# Patient Record
Sex: FEMALE | Race: OTHER | Employment: FULL TIME | URBAN - METROPOLITAN AREA
[De-identification: names, ages, dates, MRNs, and addresses within clinical notes are randomized per-mention and may not be internally consistent; named-entity substitution may affect disease eponyms.]

---

## 2023-05-26 ENCOUNTER — APPOINTMENT (OUTPATIENT)
Dept: PRIMARY CARE | Facility: CLINIC | Age: 43
End: 2023-05-26
Payer: COMMERCIAL

## 2023-06-09 ENCOUNTER — OFFICE VISIT (OUTPATIENT)
Dept: PRIMARY CARE | Facility: CLINIC | Age: 43
End: 2023-06-09
Payer: COMMERCIAL

## 2023-06-09 VITALS
WEIGHT: 178.4 LBS | HEART RATE: 104 BPM | HEIGHT: 62 IN | DIASTOLIC BLOOD PRESSURE: 95 MMHG | BODY MASS INDEX: 32.83 KG/M2 | OXYGEN SATURATION: 99 % | SYSTOLIC BLOOD PRESSURE: 135 MMHG

## 2023-06-09 DIAGNOSIS — Z01.419 WELL WOMAN EXAM: ICD-10-CM

## 2023-06-09 DIAGNOSIS — Z76.89 ENCOUNTER TO ESTABLISH CARE: Primary | ICD-10-CM

## 2023-06-09 DIAGNOSIS — M79.672 CHRONIC HEEL PAIN, LEFT: ICD-10-CM

## 2023-06-09 DIAGNOSIS — G89.29 CHRONIC HEEL PAIN, LEFT: ICD-10-CM

## 2023-06-09 DIAGNOSIS — E55.9 VITAMIN D DEFICIENCY: ICD-10-CM

## 2023-06-09 DIAGNOSIS — R03.0 ELEVATED BLOOD PRESSURE READING: ICD-10-CM

## 2023-06-09 DIAGNOSIS — Z12.31 VISIT FOR SCREENING MAMMOGRAM: ICD-10-CM

## 2023-06-09 DIAGNOSIS — I83.90 VARICOSE VEINS OF LOWER EXTREMITY, UNSPECIFIED LATERALITY, UNSPECIFIED WHETHER COMPLICATED: ICD-10-CM

## 2023-06-09 PROCEDURE — 1036F TOBACCO NON-USER: CPT | Performed by: STUDENT IN AN ORGANIZED HEALTH CARE EDUCATION/TRAINING PROGRAM

## 2023-06-09 PROCEDURE — 99204 OFFICE O/P NEW MOD 45 MIN: CPT | Performed by: STUDENT IN AN ORGANIZED HEALTH CARE EDUCATION/TRAINING PROGRAM

## 2023-06-09 NOTE — PROGRESS NOTES
"Subjective   Patient ID: Gricelda Red is a 42 y.o. female who presents for New Patient Visit (Discuss varicose veins and bilateral foot pain. ).        HPI    Est care   .Pt's PMH, PSH, SH, FH , meds and allergies was obtained / reviewed and updated .     Blood pressure is elevated in the office but denied any CP/ SOB/ HA/ Blurry vision / Slurry speech/ tingling/ numbness/ weakness of extremities.   Rpt BP as noted in vitals   Normal reading in 2021    Would like labs done cause she is gaining weight     Varicose veins     Left heel pain , chronic X 6months       Visit Vitals  BP (!) 135/95   Pulse 104   Ht 1.575 m (5' 2\")   Wt 80.9 kg (178 lb 6.4 oz)   LMP 06/09/2023   SpO2 99%   BMI 32.63 kg/m²   Smoking Status Never   BSA 1.88 m²      Patient's last menstrual period was 06/09/2023.     Review of Systems    Constitutional : No feeling poorly / fevers/ chills / night sweats/ fatigue   Cardiovascular : No CP /Palpitations/ lower extremity edema / syncope   As noted in HPI   Respiratory : No Cough /PRUITT/Dyspnea at rest   Gastrointestinal : No abd pain / N/V  No bloody stools/ melena / constipation  MSK :As noted in HPI   Endo : No polyuria/polydipsia/ muscle weakness / sluggishness   CNS: No confusion / HA/ tingling/ numbness/ weakness of extremities  Psychiatric: No anxiety/ depression/ SI/HI    All other systems have been reviewed and are negative for complaint       Physical Exam    Constitutional : Vitals reviewed. Alert and in no distress  Cardiovascular : RRR, Normal S1, S2, No pericardial rub/ gallop, no peripheral edema   Varicose veins noted in the LEs  Pulmonary: No respiratory distress, CTAB   MSK : Normal gait and station , strength and tone   Skin: Normal skin color and pigmentation, normal skin turgor and no rash   Neurologic : CNs 2-12 grossly intact , no obvious FNDs  Psych : A,Ox3, normal mood and affect      Assessment/Plan   Diagnoses and all orders for this visit:  Encounter to establish care  -     " "CBC; Future  -     Comprehensive Metabolic Panel; Future  -     Hemoglobin A1C; Future  -     Lipid Panel; Future  -     TSH with reflex to Free T4 if abnormal; Future  Varicose veins of lower extremity, unspecified laterality, unspecified whether complicated  -     Referral to Vascular Surgery; Future  -     Compression Stockings 15-20 mmHg  Chronic heel pain, left  -     Referral to Orthopaedic Surgery; Future  Vitamin D deficiency  -     Vitamin D, Total; Future  Visit for screening mammogram  -     BI mammo bilateral screening tomosynthesis; Future  Elevated blood pressure reading  Well woman exam  -     Referral to Gynecology; Future       42-year-old female presents to establish care with multiple concerns.      #Elevated blood pressure reading.  Repeat blood pressure is noted in the vitals.  Patient has no prior history of high blood pressure and is not on treatment.  To return to the office in 1 month.      She expressed hesitancy returning\" just for blood pressure check\"  .    I suggested that she can establish care with another physician closer to her residence.   Uncontrolled / untreated blood pressure can cause heart attacks , heart failure , strokes , kidney damage that can eventually cause complete shut down of kidneys requiring you to be on dialysis.     If you experience any chest pain , shortness of breath, headaches, blurry vision , slurry speech , weakness of your hands or legs, tingling or numbness - symptoms of heart attack and a stroke , please call 911 and go to the emergency room     #Referred to orthopedics for chronic left heel pain.    #Varicose veins: Compression stockings advised.  I doubt any intervention will be done at this stage but patient wanted an intervention, referral placed.    #Intermittent constipation: Fluid and fiber and movement advised.    #Referred her to GYN to establish care      Age appropriate labs / labs for mgmt of chronic medical conditions ordered, further mgmt " pending the results.      RTO in 1m

## 2023-06-19 ENCOUNTER — LAB (OUTPATIENT)
Dept: LAB | Facility: LAB | Age: 43
End: 2023-06-19
Payer: COMMERCIAL

## 2023-06-19 DIAGNOSIS — E55.9 VITAMIN D DEFICIENCY: ICD-10-CM

## 2023-06-19 DIAGNOSIS — Z76.89 ENCOUNTER TO ESTABLISH CARE: ICD-10-CM

## 2023-06-19 LAB
ALANINE AMINOTRANSFERASE (SGPT) (U/L) IN SER/PLAS: 28 U/L (ref 7–45)
ALBUMIN (G/DL) IN SER/PLAS: 4.1 G/DL (ref 3.4–5)
ALKALINE PHOSPHATASE (U/L) IN SER/PLAS: 67 U/L (ref 33–110)
ANION GAP IN SER/PLAS: 9 MMOL/L (ref 10–20)
ASPARTATE AMINOTRANSFERASE (SGOT) (U/L) IN SER/PLAS: 24 U/L (ref 9–39)
BILIRUBIN TOTAL (MG/DL) IN SER/PLAS: 0.8 MG/DL (ref 0–1.2)
CALCIDIOL (25 OH VITAMIN D3) (NG/ML) IN SER/PLAS: 22 NG/ML
CALCIUM (MG/DL) IN SER/PLAS: 9.3 MG/DL (ref 8.6–10.6)
CARBON DIOXIDE, TOTAL (MMOL/L) IN SER/PLAS: 28 MMOL/L (ref 21–32)
CHLORIDE (MMOL/L) IN SER/PLAS: 105 MMOL/L (ref 98–107)
CHOLESTEROL (MG/DL) IN SER/PLAS: 157 MG/DL (ref 0–199)
CHOLESTEROL IN HDL (MG/DL) IN SER/PLAS: 72.6 MG/DL
CHOLESTEROL/HDL RATIO: 2.2
CREATININE (MG/DL) IN SER/PLAS: 0.65 MG/DL (ref 0.5–1.05)
ERYTHROCYTE DISTRIBUTION WIDTH (RATIO) BY AUTOMATED COUNT: 12.2 % (ref 11.5–14.5)
ERYTHROCYTE MEAN CORPUSCULAR HEMOGLOBIN CONCENTRATION (G/DL) BY AUTOMATED: 33.5 G/DL (ref 32–36)
ERYTHROCYTE MEAN CORPUSCULAR VOLUME (FL) BY AUTOMATED COUNT: 87 FL (ref 80–100)
ERYTHROCYTES (10*6/UL) IN BLOOD BY AUTOMATED COUNT: 4.62 X10E12/L (ref 4–5.2)
ESTIMATED AVERAGE GLUCOSE FOR HBA1C: 103 MG/DL
GFR FEMALE: >90 ML/MIN/1.73M2
GLUCOSE (MG/DL) IN SER/PLAS: 97 MG/DL (ref 74–99)
HEMATOCRIT (%) IN BLOOD BY AUTOMATED COUNT: 40 % (ref 36–46)
HEMOGLOBIN (G/DL) IN BLOOD: 13.4 G/DL (ref 12–16)
HEMOGLOBIN A1C/HEMOGLOBIN TOTAL IN BLOOD: 5.2 %
LDL: 67 MG/DL (ref 0–99)
LEUKOCYTES (10*3/UL) IN BLOOD BY AUTOMATED COUNT: 5.2 X10E9/L (ref 4.4–11.3)
NRBC (PER 100 WBCS) BY AUTOMATED COUNT: 0 /100 WBC (ref 0–0)
PLATELETS (10*3/UL) IN BLOOD AUTOMATED COUNT: 321 X10E9/L (ref 150–450)
POTASSIUM (MMOL/L) IN SER/PLAS: 4 MMOL/L (ref 3.5–5.3)
PROTEIN TOTAL: 7.1 G/DL (ref 6.4–8.2)
SODIUM (MMOL/L) IN SER/PLAS: 138 MMOL/L (ref 136–145)
THYROTROPIN (MIU/L) IN SER/PLAS BY DETECTION LIMIT <= 0.05 MIU/L: 1.01 MIU/L (ref 0.44–3.98)
TRIGLYCERIDE (MG/DL) IN SER/PLAS: 87 MG/DL (ref 0–149)
UREA NITROGEN (MG/DL) IN SER/PLAS: 7 MG/DL (ref 6–23)
VLDL: 17 MG/DL (ref 0–40)

## 2023-06-19 PROCEDURE — 83036 HEMOGLOBIN GLYCOSYLATED A1C: CPT

## 2023-06-19 PROCEDURE — 36415 COLL VENOUS BLD VENIPUNCTURE: CPT

## 2023-06-19 PROCEDURE — 80053 COMPREHEN METABOLIC PANEL: CPT

## 2023-06-19 PROCEDURE — 80061 LIPID PANEL: CPT

## 2023-06-19 PROCEDURE — 84443 ASSAY THYROID STIM HORMONE: CPT

## 2023-06-19 PROCEDURE — 85027 COMPLETE CBC AUTOMATED: CPT

## 2023-06-19 PROCEDURE — 82306 VITAMIN D 25 HYDROXY: CPT

## 2023-07-10 ENCOUNTER — APPOINTMENT (OUTPATIENT)
Dept: PRIMARY CARE | Facility: CLINIC | Age: 43
End: 2023-07-10
Payer: COMMERCIAL

## 2023-10-07 PROBLEM — L72.8 OTHER FOLLICULAR CYSTS OF THE SKIN AND SUBCUTANEOUS TISSUE: Status: ACTIVE | Noted: 2021-11-12

## 2023-10-07 PROBLEM — R20.0 NUMBNESS AND TINGLING: Status: ACTIVE | Noted: 2023-10-07

## 2023-10-07 PROBLEM — H52.12 MYOPIA OF LEFT EYE: Status: ACTIVE | Noted: 2023-10-07

## 2023-10-07 PROBLEM — L81.4 OTHER MELANIN HYPERPIGMENTATION: Status: ACTIVE | Noted: 2021-11-12

## 2023-10-07 PROBLEM — D31.01 NEVUS OF RIGHT CONJUNCTIVA: Status: ACTIVE | Noted: 2023-10-07

## 2023-10-07 PROBLEM — H52.203 ASTIGMATISM OF BOTH EYES: Status: ACTIVE | Noted: 2023-10-07

## 2023-10-07 PROBLEM — I83.93 VARICOSE VEINS OF LEGS: Status: ACTIVE | Noted: 2023-10-07

## 2023-10-07 PROBLEM — E55.9 VITAMIN D DEFICIENCY: Status: ACTIVE | Noted: 2023-10-07

## 2023-10-07 PROBLEM — M54.12 CERVICAL RADICULOPATHY, ACUTE: Status: ACTIVE | Noted: 2023-10-07

## 2023-10-07 PROBLEM — M79.89 LEG SWELLING: Status: ACTIVE | Noted: 2023-10-07

## 2023-10-07 PROBLEM — R20.2 NUMBNESS AND TINGLING: Status: ACTIVE | Noted: 2023-10-07

## 2023-10-07 PROBLEM — R63.5 WEIGHT GAIN: Status: ACTIVE | Noted: 2023-10-07

## 2023-10-07 PROBLEM — M72.2 BILATERAL PLANTAR FASCIITIS: Status: ACTIVE | Noted: 2023-10-07

## 2023-10-07 PROBLEM — R53.83 FATIGUE: Status: ACTIVE | Noted: 2023-10-07

## 2023-10-07 PROBLEM — M41.9 SCOLIOSIS: Status: ACTIVE | Noted: 2023-10-07

## 2023-10-07 RX ORDER — NABUMETONE 750 MG/1
1 TABLET, FILM COATED ORAL EVERY 12 HOURS
COMMUNITY
Start: 2021-11-09 | End: 2024-01-04 | Stop reason: ALTCHOICE

## 2023-10-07 RX ORDER — FLUOXETINE 10 MG/1
CAPSULE ORAL
COMMUNITY
Start: 2014-07-24 | End: 2024-01-04 | Stop reason: ALTCHOICE

## 2023-10-07 RX ORDER — TRETINOIN 0.5 MG/G
1 CREAM TOPICAL
COMMUNITY
Start: 2021-11-12 | End: 2024-01-04 | Stop reason: ALTCHOICE

## 2023-10-07 RX ORDER — CYCLOBENZAPRINE HCL 10 MG
1 TABLET ORAL 3 TIMES DAILY PRN
COMMUNITY
Start: 2021-11-09 | End: 2024-01-04 | Stop reason: ALTCHOICE

## 2023-10-07 RX ORDER — CHOLECALCIFEROL (VITAMIN D3) 1250 MCG
1250 TABLET ORAL
COMMUNITY
End: 2024-01-04 | Stop reason: ALTCHOICE

## 2023-10-07 RX ORDER — GABAPENTIN 300 MG/1
CAPSULE ORAL
COMMUNITY
Start: 2012-03-13 | End: 2024-01-04 | Stop reason: ALTCHOICE

## 2023-10-07 RX ORDER — PROMETHAZINE HYDROCHLORIDE 6.25 MG/5ML
10 SYRUP ORAL 2 TIMES DAILY PRN
COMMUNITY
Start: 2022-04-21 | End: 2024-01-04 | Stop reason: ALTCHOICE

## 2023-10-07 RX ORDER — OXAPROZIN 600 MG/1
600 TABLET, FILM COATED ORAL 2 TIMES DAILY
COMMUNITY
Start: 2012-03-13 | End: 2024-01-04 | Stop reason: ALTCHOICE

## 2023-10-07 RX ORDER — CEFDINIR 300 MG/1
1 CAPSULE ORAL 2 TIMES DAILY
COMMUNITY
Start: 2022-04-21 | End: 2024-01-04 | Stop reason: ALTCHOICE

## 2023-10-07 RX ORDER — LEVOCETIRIZINE DIHYDROCHLORIDE 5 MG/1
1 TABLET, FILM COATED ORAL DAILY
COMMUNITY
Start: 2022-04-21 | End: 2024-01-04 | Stop reason: ALTCHOICE

## 2023-10-07 ASSESSMENT — CUP TO DISC RATIO
OD_RATIO: 0.3
OS_RATIO: 0.3

## 2023-10-07 ASSESSMENT — SLIT LAMP EXAM - LIDS
COMMENTS: GOOD POSITION
COMMENTS: GOOD POSITION

## 2023-10-07 ASSESSMENT — EXTERNAL EXAM - LEFT EYE: OS_EXAM: NORMAL

## 2023-10-07 ASSESSMENT — EXTERNAL EXAM - RIGHT EYE: OD_EXAM: NORMAL

## 2023-10-07 NOTE — PROGRESS NOTES
Nevus of conjunctiva, right eye  -Noted on exam in 2022 ([facility]ass)  -Patient hasn't noted change over time.   -No concerning features on exam at this time. Recommend observation.     Astigmatism  -New Rx given per patient request  -Obtained glasses in 2022 - bifocals/progressives initially, but did not like this, had trouble with reading, switched back to DVO  -Prefers DVO

## 2023-10-09 ENCOUNTER — OFFICE VISIT (OUTPATIENT)
Dept: OPHTHALMOLOGY | Facility: CLINIC | Age: 43
End: 2023-10-09
Payer: COMMERCIAL

## 2023-10-09 DIAGNOSIS — D31.01 NEVUS OF RIGHT CONJUNCTIVA: Primary | ICD-10-CM

## 2023-10-09 DIAGNOSIS — H52.203 ASTIGMATISM OF BOTH EYES, UNSPECIFIED TYPE: ICD-10-CM

## 2023-10-09 PROCEDURE — 92015 DETERMINE REFRACTIVE STATE: CPT | Performed by: OPHTHALMOLOGY

## 2023-10-09 PROCEDURE — 92014 COMPRE OPH EXAM EST PT 1/>: CPT | Performed by: OPHTHALMOLOGY

## 2023-10-09 ASSESSMENT — ENCOUNTER SYMPTOMS
NEUROLOGICAL NEGATIVE: 0
HEMATOLOGIC/LYMPHATIC NEGATIVE: 0
CARDIOVASCULAR NEGATIVE: 0
ALLERGIC/IMMUNOLOGIC NEGATIVE: 0
GASTROINTESTINAL NEGATIVE: 0
RESPIRATORY NEGATIVE: 0
CONSTITUTIONAL NEGATIVE: 0
ENDOCRINE NEGATIVE: 0
EYES NEGATIVE: 0
MUSCULOSKELETAL NEGATIVE: 0
PSYCHIATRIC NEGATIVE: 0

## 2023-10-09 ASSESSMENT — VISUAL ACUITY
OD_SC: 20/25
OS_SC: 20/30
METHOD: SNELLEN - LINEAR

## 2023-10-09 ASSESSMENT — REFRACTION_MANIFEST
OD_SPHERE: -0.25
OS_SPHERE: -0.50
OD_CYLINDER: -0.50
OD_AXIS: 080
OS_CYLINDER: -0.50
OS_AXIS: 090
METHOD_AUTOREFRACTION: 1
OS_SPHERE: -0.25
OS_CYLINDER: -0.50
OS_AXIS: 090
OD_AXIS: 090
OD_CYLINDER: -0.75
OD_SPHERE: -0.25

## 2023-10-09 ASSESSMENT — TONOMETRY
IOP_METHOD: GOLDMANN APPLANATION
OS_IOP_MMHG: 16
OD_IOP_MMHG: 15

## 2023-10-31 ENCOUNTER — HOSPITAL ENCOUNTER (OUTPATIENT)
Dept: RADIOLOGY | Facility: EXTERNAL LOCATION | Age: 43
Discharge: HOME | End: 2023-10-31
Payer: COMMERCIAL

## 2023-10-31 DIAGNOSIS — S99.921A RIGHT FOOT INJURY, INITIAL ENCOUNTER: ICD-10-CM

## 2023-10-31 DIAGNOSIS — S99.911A RIGHT ANKLE INJURY, INITIAL ENCOUNTER: ICD-10-CM

## 2023-11-01 ENCOUNTER — CLINICAL SUPPORT (OUTPATIENT)
Dept: SPORTS MEDICINE | Facility: HOSPITAL | Age: 43
End: 2023-11-01
Payer: COMMERCIAL

## 2023-11-01 VITALS — HEIGHT: 63 IN | WEIGHT: 180 LBS | BODY MASS INDEX: 31.89 KG/M2

## 2023-11-01 DIAGNOSIS — S92.354A CLOSED NONDISPLACED FRACTURE OF FIFTH METATARSAL BONE OF RIGHT FOOT, INITIAL ENCOUNTER: ICD-10-CM

## 2023-11-01 DIAGNOSIS — S92.351A CLOSED FRACTURE OF BASE OF FIFTH METATARSAL BONE OF RIGHT FOOT, INITIAL ENCOUNTER: Primary | ICD-10-CM

## 2023-11-01 PROCEDURE — 99214 OFFICE O/P EST MOD 30 MIN: CPT | Performed by: PHYSICIAN ASSISTANT

## 2023-11-01 PROCEDURE — 99204 OFFICE O/P NEW MOD 45 MIN: CPT | Performed by: PHYSICIAN ASSISTANT

## 2023-11-01 RX ORDER — TRAMADOL HYDROCHLORIDE 50 MG/1
50 TABLET ORAL EVERY 8 HOURS PRN
Qty: 15 TABLET | Refills: 0 | Status: SHIPPED | OUTPATIENT
Start: 2023-11-01 | End: 2023-11-06

## 2023-11-01 ASSESSMENT — PAIN DESCRIPTION - DESCRIPTORS: DESCRIPTORS: SHARP

## 2023-11-01 ASSESSMENT — PAIN - FUNCTIONAL ASSESSMENT: PAIN_FUNCTIONAL_ASSESSMENT: 0-10

## 2023-11-01 ASSESSMENT — PAIN SCALES - GENERAL: PAINLEVEL_OUTOF10: 10 - WORST POSSIBLE PAIN

## 2023-11-01 NOTE — PROGRESS NOTES
Subjective    Patient ID: Gricelda Red is a 43 y.o. female.    Chief Complaint: Pain of the Right Foot (Nondisplaced fracture at the base of the 5th metatarsal)           HPI:  Gricelda Red is a 43 y.o. female who presents to the orthopedic walk-in clinic for complaint of right foot pain.  She injured her right foot yesterday when she tripped going down the stairs.  She states that she inverted her foot and ankle.  She had pain over the lateral aspect of her foot and ankle.  She went to an urgent care.  X-rays had shown a proximal fifth metatarsal fracture.  She is provided with a fracture shoe and referred to orthopedics.  Today she reports 10 out of 10 pain.  She has difficulty bearing full weight.  Ibuprofen has not been helpful to control her symptoms.    ROS  Constitutional: No fever, no chills, not feeling tired, no recent weight gain and no recent weight loss  ENT: No nosebleeds  Cardiovascular: No chest pain  Respiratory: No shortness of breath and no cough  Gastrointestinal: No abdominal pain, no nausea, no diarrhea, and no vomiting  Musculoskeletal: No arthralgias  Integumentary: No rashes and no skin lesions  Neurological: No headache  Psychiatric: No sleep disturbances no depression  Endocrine: No muscle weakness and no muscle cramps  Hematologic/lymphatic: No swelling glands and no tendency for easy bruising    Past Medical History:   Diagnosis Date    Personal history of other diseases of the musculoskeletal system and connective tissue 04/24/2021    History of neck pain        History reviewed. No pertinent surgical history.       Current Outpatient Medications:     BARIUM SULFATE ORAL, Take 900 mL by mouth if needed. barium sulfate 1.2 % ORAL Susp, Disp: , Rfl:     benzoyl peroxide 5 % lotion, 1 Application., Disp: , Rfl:     cefdinir (Omnicef) 300 mg capsule, Take 1 capsule (300 mg) by mouth 2 times a day., Disp: , Rfl:     cholecalciferol (Vitamin D3) 1,250 mcg (50,000 unit) tablet, Take 1,250 mcg by  mouth every 7 days., Disp: , Rfl:     cyclobenzaprine (Flexeril) 10 mg tablet, Take 1 tablet (10 mg) by mouth 3 times a day as needed., Disp: , Rfl:     diclofenac sodium 1 % kit, APPLY TO UPPER EXTREMITIES, 2 GM OF GEL TO AFFECTED AREA 4 TIMES DAILY. DO NOT APPLY MORE THAN 8 GM DAILY TO ANY ONE AFFECTED JOINT., Disp: , Rfl:     FLUoxetine (PROzac) 10 mg capsule, TAKE 1 CAPSULE BY MOUTH EVERY OTHER DAY FOR 4 DAYS THEN TAKE 1 CAPSULE DAILY FOR 4 DAYS THEN TAKE 2 CAPSULES DAILY THEREAFTER, Disp: , Rfl:     gabapentin (Neurontin) 300 mg capsule, Take by mouth. 1 cap qhs x 4 days then bid x 4 days thereafter 2 caps qhs, 1 cap q am, Disp: , Rfl:     HYDROCODONE-ACETAMINOPHEN ORAL, Take 1 tablet by mouth every 6 hours if needed. acetaminophen-hydrocodone (VICODIN) 5-500 mg ORAL tablet, Disp: , Rfl:     levocetirizine (Xyzal) 5 mg tablet, Take 1 tablet (5 mg) by mouth once daily., Disp: , Rfl:     nabumetone (Relafen) 750 mg tablet, Take 1 tablet (750 mg) by mouth every 12 hours., Disp: , Rfl:     oxaprozin (Daypro) 600 mg tablet, Take 1 tablet (600 mg) by mouth 2 times a day., Disp: , Rfl:     promethazine (Phenergan) 6.25 mg/5 mL syrup, Take 10 mL (12.5 mg) by mouth 2 times a day as needed (After Meals for cough)., Disp: , Rfl:     traMADol (Ultram) 50 mg tablet, Take 1 tablet (50 mg) by mouth every 8 hours if needed for severe pain (7 - 10) for up to 5 days., Disp: 15 tablet, Rfl: 0    tretinoin (Retin-A) 0.05 % cream, 1 Application., Disp: , Rfl:      No Known Allergies     Social Connections: Not on file          Objective   43-year-old female well appearing in no acute distress. Alert and oriented ×3.  Skin intact bilateral lower extremities.   In a wheelchair.  Bilateral lower extremity compartments supple.  5 out of 5 distal motor strength bilaterally.  2+ DP/PT pulses bilaterally.  Right foot no obvious deformity.  Mild swelling.  No significant ecchymosis.  Tender palpation directly over the ATFL and the proximal  fifth metatarsal.  Limited active range of motion of the ankle secondary to pain.    Image Results:  X-rays of the right foot from the urgent care dated yesterday were reviewed today.  These demonstrated a minimally displaced avulsion fracture of the base of the fifth metatarsal.      Assessment/Plan   Encounter Diagnoses:  Right fifth metatarsal fracture    Orders Placed This Encounter    Walking boot       She was placed in a tall cam walker boot today.  She can bear partial weight while in the boot.  She should try to keep her foot and ankle up and elevate is much as possible to help reduce swelling.  She can take the boot off to apply ice for 10 to 15 minutes several times a day.  She was given a prescription for tramadol to help with pain.  She can continue to use ibuprofen and Tylenol as needed.  We will have her follow-up with one of our foot and ankle specialist in the next week or so.    Patient was prescribed a cam walker boot for fifth metatarsal fracture.The patient is ambulatory with or without aid; but, has weakness, instability and/or deformity of their right foot which requires stabilization from this orthosis to improve their function.      Verbal and written instructions for the use, wear schedule, cleaning and application of this item were given.  Patient was instructed that should the brace result in increased pain, decreased sensation, increased swelling, or an overall worsening of their medical condition, to please contact our office immediately.     Orthotic management and training was provided for skin care, modifications due to healing tissues, edema changes, interruption in skin integrity, and safety precautions with the orthosis.    This office note was dictated using Dragon voice to text software and was not proofread for spelling or grammatical errors

## 2023-11-06 ENCOUNTER — OFFICE VISIT (OUTPATIENT)
Dept: ORTHOPEDIC SURGERY | Facility: CLINIC | Age: 43
End: 2023-11-06
Payer: COMMERCIAL

## 2023-11-06 DIAGNOSIS — S93.491A SPRAIN OF ANTERIOR TALOFIBULAR LIGAMENT OF RIGHT ANKLE, INITIAL ENCOUNTER: ICD-10-CM

## 2023-11-06 DIAGNOSIS — S92.351A CLOSED FRACTURE OF BASE OF FIFTH METATARSAL BONE OF RIGHT FOOT: Primary | ICD-10-CM

## 2023-11-06 PROCEDURE — 99213 OFFICE O/P EST LOW 20 MIN: CPT | Performed by: STUDENT IN AN ORGANIZED HEALTH CARE EDUCATION/TRAINING PROGRAM

## 2023-11-06 PROCEDURE — L4361 PNEUMA/VAC WALK BOOT PRE OTS: HCPCS | Performed by: PHYSICIAN ASSISTANT

## 2023-11-06 PROCEDURE — 1036F TOBACCO NON-USER: CPT | Performed by: STUDENT IN AN ORGANIZED HEALTH CARE EDUCATION/TRAINING PROGRAM

## 2023-11-06 ASSESSMENT — PAIN DESCRIPTION - DESCRIPTORS: DESCRIPTORS: ACHING;STABBING;SHARP;SHOOTING

## 2023-11-06 ASSESSMENT — PAIN SCALES - GENERAL: PAINLEVEL_OUTOF10: 10 - WORST POSSIBLE PAIN

## 2023-11-06 ASSESSMENT — PAIN - FUNCTIONAL ASSESSMENT: PAIN_FUNCTIONAL_ASSESSMENT: 0-10

## 2023-11-06 NOTE — LETTER
November 6, 2023     Patient: Gricelda Red   YOB: 1980   Date of Visit: 11/6/2023       To Whom It May Concern:    It is my medical opinion that Gricelda Red  will require a SEATED work assignment due to a new right foot fracture. Restrictions will be re-evaluated at next office visit 12/8/2023 .    If you have any questions or concerns, please don't hesitate to call.         Sincerely,        Pastor Briggs MD    CC: No Recipients

## 2023-11-06 NOTE — PROGRESS NOTES
ORTHOPAEDIC SURGERY OUTPATIENT PROGRESS NOTE    Chief Complaint:  Right foot pain    History Of Present Illness  Gricelda Red is a 43 y.o. female who presents for follow-up of right foot pain from [facility], previously known to me for bilateral planter fasciitis.  Patient sustained a twisting injury to her foot from 10/31/2023.  Patient was initially seen in an urgent care setting and later in [facility].  X-rays were obtained that demonstrated base of the fifth metatarsal fracture, she was placed into a walking boot and recommended for foot and ankle orthopedic surgery follow-up.  Patient has been without DVT prophylaxis.  She is reporting severe 10 out of 10 pain in her foot.  Pain is worse with standing and is described as stabbing and sharp.  Patient reports no prior history of twisting injury to this foot or ankle.  She is starting a new job as of next week and has plans to travel for a wedding.     Past Medical History  Past Medical History:   Diagnosis Date    Personal history of other diseases of the musculoskeletal system and connective tissue 04/24/2021    History of neck pain       Surgical History  Recent Surgeries in Orthopaedic Surgery            No cases to display             Social History  Social History     Socioeconomic History    Marital status:      Spouse name: Not on file    Number of children: Not on file    Years of education: Not on file    Highest education level: Not on file   Occupational History    Not on file   Tobacco Use    Smoking status: Never    Smokeless tobacco: Never   Substance and Sexual Activity    Alcohol use: Not Currently    Drug use: Not Currently    Sexual activity: Not on file   Other Topics Concern    Not on file   Social History Narrative    Not on file     Social Determinants of Health     Financial Resource Strain: Not on file   Food Insecurity: Not on file   Transportation Needs: Not on file   Physical Activity: Not on file   Stress: Not on file   Social Connections:  Not on file   Intimate Partner Violence: Not on file   Housing Stability: Not on file       Family History  Family History   Problem Relation Name Age of Onset    Heart attack Father          Allergies  No Known Allergies    Review of Systems  REVIEW OF SYSTEMS  Constitutional: no unplanned weight loss  Psychiatric: no suicidal ideation  ENT: no vision changes, no sinus problems  Pulmonary: no shortness of breath during office visit today  Lymphatic: no enlarged lymph nodes  Cardiovascular: no chest pain or shortness of breath during office visit today  Gastrointestinal: no stomach problems  Genitourinary: no dysuria   Skin: no rashes  Endocrine: no thyroid problems  Neurological: no headache, no numbness  Hematological: no easy bruising  Musculoskeletal: Right foot and ankle pain     Physical Exam  PHYSICAL EXAMINATION  Constitutional Exam: well developed and well nourished  Psychiatric Exam: alert and oriented, appropriate mood and behavior  Eye Exam: EOMI  Pulmonary Exam: breathing non-labored, no apparent distress  Lymphatic exam: no appreciable lymphadenopathy in the lower extremities  Cardiovascular exam: RRR to peripheral palpation, DP pulses 2+, PT 2+, toes are pink with good capillary refill, no pitting edema  Skin exam: no open lesions, rashes, abrasions or ulcerations  Neurological exam: sensation to light touch intact in both lower extremities in peripheral and dermatomal distributions (except for any abnormalities noted in musculoskeletal exam)    Musculoskeletal exam: Right lower extremity examination.  Patient pain localizes to the anterolateral ankle, there is an obvious effusion as well as the base of the fifth metatarsal.  She is focally tender to palpation in both these regions.  Patient has prominent varicosities of the distal lateral tibia for which she follows with another provider.  Nontender to palpation.  Patient has pain limited but supple ankle ROM.  Patient has sensation intact to light  touch grossly in a saphenous, sural, superficial peroneal, deep peroneal and tibial nerve distribution.  She has intact but pain limited plantarflexion, dorsiflexion and EHL.  She has 2+ DP/PT pulse palpated.  She has a positive anterior drawer, negative talar tilt and negative syndesmotic squeeze test.     Last Recorded Vitals  There were no vitals taken for this visit.    Laboratory Results  No results found for this or any previous visit (from the past 24 hour(s)).     Radiology Results  X-ray imaging 3 view nonweightbearing right foot reviewed from 10/21/2023 and independently evaluated by me on 11/06/2023 demonstrates nondisplaced base of fifth metatarsal fracture.    Assessment/Plan:  43-year-old female who my impression has a right base of fifth metatarsal fracture and right ankle sprain involving ATFL.  I have reviewed the diagnosis and treatment options extensively with the patient.  In my impression, the patient may continue weightbearing as tolerated in her right lower extremity in a walking boot.  I recommend that she begin 81 mg ASA p.o. twice daily for DVT prophylaxis.  I discussed that due to her immobilization she should consider formal anticoagulation prior to any planned travel.  She will plan to discuss this with her PCP but I would consider Lovenox versus apixaban.  I will plan to see the patient back in approximately 3 weeks to monitor her progress.  I anticipate transitioning her out of the boot and into a brace pending review of her symptoms.  Upon return, patient will require 3 views weightbearing right foot.    Pastor Briggs MD, MARTÍN  Department of Orthopaedic Surgery  [facility]    The diagnosis and treatment plan were reviewed with the patient. All questions were answered. The patient verbalized understanding of the treatment plan. There were no barriers to understanding identified.    Note dictated with [employer] software.   Completed without full type editing and sent to avoid delay.

## 2023-11-09 RX ORDER — ASPIRIN 81 MG/1
81 TABLET ORAL
Qty: 84 TABLET | Refills: 0 | Status: SHIPPED | OUTPATIENT
Start: 2023-11-09 | End: 2023-12-21

## 2023-11-27 ENCOUNTER — TELEPHONE (OUTPATIENT)
Dept: ORTHOPEDIC SURGERY | Facility: CLINIC | Age: 43
End: 2023-11-27

## 2023-11-28 ENCOUNTER — TELEPHONE (OUTPATIENT)
Dept: ORTHOPEDICS | Facility: HOSPITAL | Age: 43
End: 2023-11-28
Payer: COMMERCIAL

## 2023-11-28 DIAGNOSIS — S92.351A CLOSED FRACTURE OF BASE OF FIFTH METATARSAL BONE OF RIGHT FOOT: Primary | ICD-10-CM

## 2023-11-28 RX ORDER — ACETAMINOPHEN 500 MG
1000 TABLET ORAL EVERY 6 HOURS PRN
Qty: 30 TABLET | Refills: 0 | Status: SHIPPED | OUTPATIENT
Start: 2023-11-28 | End: 2023-12-08

## 2023-11-28 NOTE — TELEPHONE ENCOUNTER
I contacted the patient via the number listed in the chart, [phone].  The patient is planned to travel out of the country and has been weightbearing as tolerated in a walking boot.  Due to her increased risk of blood clots with immobilization in the boot, I will prescribe her a short course of apixaban for DVT PPX. I have advised her to stop taking aspirin.  She has been taking ibuprofen regularly and I have advised her to stop using this medication as well while on apixaban.  I will prescribe her a short course of acetaminophen.  I will plan to see the patient back for scheduled follow-up appointment.    Pastor Briggs MD, MARTÍN  Department of Orthopaedic Surgery  [facility]

## 2023-12-04 ENCOUNTER — ANCILLARY PROCEDURE (OUTPATIENT)
Dept: RADIOLOGY | Facility: CLINIC | Age: 43
End: 2023-12-04
Payer: COMMERCIAL

## 2023-12-04 ENCOUNTER — OFFICE VISIT (OUTPATIENT)
Dept: ORTHOPEDIC SURGERY | Facility: CLINIC | Age: 43
End: 2023-12-04
Payer: COMMERCIAL

## 2023-12-04 DIAGNOSIS — S92.351A CLOSED FRACTURE OF BASE OF FIFTH METATARSAL BONE OF RIGHT FOOT: ICD-10-CM

## 2023-12-04 DIAGNOSIS — S92.351A CLOSED FRACTURE OF BASE OF FIFTH METATARSAL BONE OF RIGHT FOOT: Primary | ICD-10-CM

## 2023-12-04 DIAGNOSIS — S93.491A SPRAIN OF ANTERIOR TALOFIBULAR LIGAMENT OF RIGHT ANKLE, INITIAL ENCOUNTER: ICD-10-CM

## 2023-12-04 PROCEDURE — 1036F TOBACCO NON-USER: CPT | Performed by: STUDENT IN AN ORGANIZED HEALTH CARE EDUCATION/TRAINING PROGRAM

## 2023-12-04 PROCEDURE — 99213 OFFICE O/P EST LOW 20 MIN: CPT | Performed by: STUDENT IN AN ORGANIZED HEALTH CARE EDUCATION/TRAINING PROGRAM

## 2023-12-04 PROCEDURE — 73630 X-RAY EXAM OF FOOT: CPT | Mod: RT,FY

## 2023-12-04 ASSESSMENT — PAIN - FUNCTIONAL ASSESSMENT: PAIN_FUNCTIONAL_ASSESSMENT: 0-10

## 2023-12-04 ASSESSMENT — PAIN DESCRIPTION - DESCRIPTORS: DESCRIPTORS: ACHING;TENDER;SORE

## 2023-12-04 ASSESSMENT — PAIN SCALES - GENERAL: PAINLEVEL_OUTOF10: 9

## 2023-12-04 NOTE — PROGRESS NOTES
ORTHOPAEDIC SURGERY OUTPATIENT PROGRESS NOTE    Chief Complaint:  Right foot pain    History Of Present Illness  Gricelda Red is a 43 y.o. female who presents for follow-up of right foot pain from [facility], previously known to me for bilateral planter fasciitis.  Patient sustained a twisting injury to her foot from 10/31/2023.  Patient was initially seen in an urgent care setting and later in [facility].  X-rays were obtained that demonstrated base of the fifth metatarsal fracture, she was placed into a walking boot and recommended for foot and ankle orthopedic surgery follow-up.  Patient has been without DVT prophylaxis.  She is reporting severe 10 out of 10 pain in her foot.  Pain is worse with standing and is described as stabbing and sharp.  Patient reports no prior history of twisting injury to this foot or ankle.  She is starting a new job as of next week and has plans to travel for a wedding.    12/04/2023: Patient returns for follow-up of her right foot pain.  Patient has continued ambulating in a walking boot with cane assist.  She is complaining of 9 out of 10 pain.  She has noticed continued swelling and discomfort while weightbearing.  She has returned from [place] and I provided her with a prescription for Eliquis for DVT prophylaxis during travel while in the boot.  She denies any new onset numbness, tingling or weakness but she does report burning pain when ambulating, particularly in the bottom of her foot including her toes.     Past Medical History  Past Medical History:   Diagnosis Date    Personal history of other diseases of the musculoskeletal system and connective tissue 04/24/2021    History of neck pain       Surgical History  Recent Surgeries in Orthopaedic Surgery            No cases to display             Social History  Social History     Socioeconomic History    Marital status:      Spouse name: None    Number of children: None    Years of education: None    Highest education level: None    Occupational History    None   Tobacco Use    Smoking status: Never    Smokeless tobacco: Never   Substance and Sexual Activity    Alcohol use: Not Currently    Drug use: Not Currently    Sexual activity: None   Other Topics Concern    None   Social History Narrative    None     Social Determinants of Health     Financial Resource Strain: Not on file   Food Insecurity: Not on file   Transportation Needs: Not on file   Physical Activity: Not on file   Stress: Not on file   Social Connections: Not on file   Intimate Partner Violence: Not on file   Housing Stability: Not on file       Family History  Family History   Problem Relation Name Age of Onset    Heart attack Father          Allergies  No Known Allergies    Review of Systems  REVIEW OF SYSTEMS  Constitutional: no unplanned weight loss  Psychiatric: no suicidal ideation  ENT: no vision changes, no sinus problems  Pulmonary: no shortness of breath during office visit today  Lymphatic: no enlarged lymph nodes  Cardiovascular: no chest pain or shortness of breath during office visit today  Gastrointestinal: no stomach problems  Genitourinary: no dysuria   Skin: no rashes  Endocrine: no thyroid problems  Neurological: no headache, no numbness  Hematological: no easy bruising  Musculoskeletal: Right foot and ankle pain     Physical Exam  PHYSICAL EXAMINATION  Constitutional Exam: well developed and well nourished  Psychiatric Exam: alert and oriented, appropriate mood and behavior  Eye Exam: EOMI  Pulmonary Exam: breathing non-labored, no apparent distress  Lymphatic exam: no appreciable lymphadenopathy in the lower extremities  Cardiovascular exam: RRR to peripheral palpation, DP pulses 2+, PT 2+, toes are pink with good capillary refill, no pitting edema  Skin exam: no open lesions, rashes, abrasions or ulcerations  Neurological exam: sensation to light touch intact in both lower extremities in peripheral and dermatomal distributions (except for any abnormalities  noted in musculoskeletal exam)    Musculoskeletal exam: Right lower extremity examination.  Patient pain localized to the lateral fifth metatarsal.  She is focally tender to palpation there as well as globally about the foot and about the ATFL.  She has dorsal soft tissue swelling, 1-2+ and with positive Tinel's overlying the posterior tarsal tunnel as well as anterior tarsal tunnel.  Patient has pain out of proportion to expected examination.  Patient has supple but limited ankle and subtalar ROM, she is quite apprehensive on examination.  Patient has sensation intact light touch grossly in a saphenous, sural, superficial peroneal, deep peroneal and tibial nerve distribution.  She has intact but pain and apprehension limited PF/DF/EHL, she has intact inversion as well as eversion.  She has 2+ DP/PT pulse palpated.     Last Recorded Vitals  There were no vitals taken for this visit.    Laboratory Results  No results found for this or any previous visit (from the past 24 hour(s)).     Radiology Results  X-ray imaging 3 view nonweightbearing right foot reviewed from 12/0/2023 and independently evaluated by me demonstrates nondisplaced base of fifth metatarsal fracture with grossly retained alignment, no obvious proximal migration with suggested remodeling on the oblique film.    Assessment/Plan:  43-year-old female who in my impression has a right base of fifth metatarsal fracture in the setting of ankle sprain involving ATFL.  I have reviewed the diagnosis and treatment options with the patient.  In my impression she may begin progressive weightbearing right lower extremity and transition out of the walking boot.  Discussed with patient I am concerned that based on her examination that she is at risk for developing CRPS as her burning pain is not well explained by her fracture pattern.  I will refer her formally to physical therapy to work on ankle range of motion, strengthening and proprioceptive training.  In this  clinical setting I would often encourage bracing but I am concerned that the patient would be intolerant to this based on her examination today.  I encouraged the patient to begin desensitization at home and encouraged ROM.  I reviewed with the patient that if she is not clinically improving that we may consider referral to pain management.  Regarding her fifth metatarsal base injury, I again discussed with the patient that if she were to develop a symptomatic Mal/nonunion requiring treatment that based on the injury pattern I may consider fragment excision and PB tendon advancement.  I will plan to see the patient back in approximately 1 month for repeat clinical and radiographic evaluation.  Upon return, patient will require 3 views weightbearing right foot.    Pastor Briggs MD, MARTÍN  Department of Orthopaedic Surgery  [facility]    The diagnosis and treatment plan were reviewed with the patient. All questions were answered. The patient verbalized understanding of the treatment plan. There were no barriers to understanding identified.    Note dictated with [employer] software.  Completed without full type editing and sent to avoid delay.

## 2023-12-04 NOTE — LETTER
December 4, 2023     Patient: Gricelda Red   YOB: 1980   Date of Visit: 12/4/2023       To Whom It May Concern:    It is my medical opinion that Gricelda Red should remain out of work until 1- due to a foot fracture. Restrictions to be re-evaluated at next office visit.    If you have any questions or concerns, please don't hesitate to call.         Sincerely,        Pastor Briggs MD    CC: No Recipients

## 2023-12-08 ENCOUNTER — APPOINTMENT (OUTPATIENT)
Dept: ORTHOPEDIC SURGERY | Facility: CLINIC | Age: 43
End: 2023-12-08
Payer: COMMERCIAL

## 2023-12-22 ENCOUNTER — APPOINTMENT (OUTPATIENT)
Dept: PRIMARY CARE | Facility: CLINIC | Age: 43
End: 2023-12-22
Payer: COMMERCIAL

## 2024-01-04 ENCOUNTER — OFFICE VISIT (OUTPATIENT)
Dept: PRIMARY CARE | Facility: CLINIC | Age: 44
End: 2024-01-04
Payer: COMMERCIAL

## 2024-01-04 VITALS
TEMPERATURE: 97.5 F | OXYGEN SATURATION: 98 % | HEART RATE: 71 BPM | SYSTOLIC BLOOD PRESSURE: 126 MMHG | DIASTOLIC BLOOD PRESSURE: 84 MMHG | BODY MASS INDEX: 33.06 KG/M2 | WEIGHT: 186.6 LBS | HEIGHT: 63 IN

## 2024-01-04 DIAGNOSIS — N92.0 MENORRHAGIA WITH REGULAR CYCLE: ICD-10-CM

## 2024-01-04 DIAGNOSIS — S92.351A CLOSED FRACTURE OF BASE OF FIFTH METATARSAL BONE OF RIGHT FOOT: ICD-10-CM

## 2024-01-04 DIAGNOSIS — G43.E09 CHRONIC MIGRAINE WITH AURA WITHOUT STATUS MIGRAINOSUS, NOT INTRACTABLE: Primary | ICD-10-CM

## 2024-01-04 PROCEDURE — 99203 OFFICE O/P NEW LOW 30 MIN: CPT | Performed by: INTERNAL MEDICINE

## 2024-01-04 PROCEDURE — 1036F TOBACCO NON-USER: CPT | Performed by: INTERNAL MEDICINE

## 2024-01-04 RX ORDER — SUMATRIPTAN 50 MG/1
50 TABLET, FILM COATED ORAL ONCE AS NEEDED
Qty: 15 TABLET | Refills: 2 | Status: SHIPPED | OUTPATIENT
Start: 2024-01-04 | End: 2024-02-03

## 2024-01-04 ASSESSMENT — COLUMBIA-SUICIDE SEVERITY RATING SCALE - C-SSRS
1. IN THE PAST MONTH, HAVE YOU WISHED YOU WERE DEAD OR WISHED YOU COULD GO TO SLEEP AND NOT WAKE UP?: NO
2. HAVE YOU ACTUALLY HAD ANY THOUGHTS OF KILLING YOURSELF?: NO
6. HAVE YOU EVER DONE ANYTHING, STARTED TO DO ANYTHING, OR PREPARED TO DO ANYTHING TO END YOUR LIFE?: NO

## 2024-01-04 ASSESSMENT — ENCOUNTER SYMPTOMS
LOSS OF SENSATION IN FEET: 0
OCCASIONAL FEELINGS OF UNSTEADINESS: 0
DEPRESSION: 0

## 2024-01-04 ASSESSMENT — PAIN SCALES - GENERAL: PAINLEVEL: 7

## 2024-01-04 ASSESSMENT — PATIENT HEALTH QUESTIONNAIRE - PHQ9
1. LITTLE INTEREST OR PLEASURE IN DOING THINGS: NOT AT ALL
2. FEELING DOWN, DEPRESSED OR HOPELESS: NOT AT ALL
SUM OF ALL RESPONSES TO PHQ9 QUESTIONS 1 AND 2: 0

## 2024-01-04 NOTE — PROGRESS NOTES
"Subjective   Patient ID: Gricelda Red is a 43 y.o. female who presents for New Patient Visit (Dizziness at night).    HPI     Patient is here to establish care  Has history of migraine headaches, takes Excedrin as needed.  Stated has migraine headaches especially during her menstrual cycle.  Feels dizzy whenever she has headache.  Had fractured base of 5th metatarsal of right foot, she missed a step in October of 2023, recent x ray showed persistent fracture base of 5th metatarsal.  Following with podiatrist  Planing of heavy menstrual cycles, seen in nurse practitioner, wants to see somebody closer to her house    Review of Systems   Constitutional:  Negative for chills, fatigue and unexpected weight change.   HENT:  Negative for postnasal drip, sinus pressure and trouble swallowing.    Respiratory:  Negative for cough, shortness of breath and wheezing.    Cardiovascular:  Negative for chest pain, palpitations and leg swelling.   Gastrointestinal:  Negative for abdominal pain, blood in stool, nausea and vomiting.   Endocrine: Negative for polydipsia, polyphagia and polyuria.   Genitourinary:  Negative for dysuria and frequency.   Musculoskeletal:  Negative for back pain and myalgias.        Right foot pain   Skin:  Negative for rash.   Neurological:  Positive for headaches. Negative for tremors, seizures and numbness.   Psychiatric/Behavioral:  Negative for behavioral problems.        Objective   /84 (BP Location: Left arm, Patient Position: Sitting, BP Cuff Size: Large adult)   Pulse 71   Temp 36.4 °C (97.5 °F) (Temporal)   Ht 1.6 m (5' 3\")   Wt 84.6 kg (186 lb 9.6 oz)   SpO2 98%   BMI 33.05 kg/m²     Physical Exam  Constitutional:       General: She is not in acute distress.  HENT:      Head: Normocephalic and atraumatic.   Eyes:      Extraocular Movements: Extraocular movements intact.      Conjunctiva/sclera: Conjunctivae normal.      Pupils: Pupils are equal, round, and reactive to light. "   Cardiovascular:      Rate and Rhythm: Normal rate and regular rhythm.      Pulses: Normal pulses.      Heart sounds: No murmur heard.  Pulmonary:      Effort: Pulmonary effort is normal.      Breath sounds: Normal breath sounds. No wheezing or rales.   Abdominal:      General: Bowel sounds are normal.      Palpations: Abdomen is soft. There is no mass.      Tenderness: There is no abdominal tenderness. There is no guarding.   Musculoskeletal:      Comments: Right foot in boot   Skin:     General: Skin is warm and dry.   Neurological:      Mental Status: She is alert.   Psychiatric:         Mood and Affect: Mood normal.         Assessment/Plan       Gricelda was seen today for new patient visit.  Diagnoses and all orders for this visit:  Chronic migraine with aura without status migrainosus, not intractable (Primary)  -     SUMAtriptan (Imitrex) 50 mg tablet; Take 1 tablet (50 mg) by mouth 1 time if needed for migraine. May repeat after 2 hours.  Menorrhagia with regular cycle  -     Referral to Gynecology; Future  Closed fracture of base of fifth metatarsal bone of right foot     Follow-up with podiatry for right foot pain

## 2024-01-05 ASSESSMENT — ENCOUNTER SYMPTOMS
BLOOD IN STOOL: 0
FREQUENCY: 0
DYSURIA: 0
COUGH: 0
BACK PAIN: 0
VOMITING: 0
MYALGIAS: 0
ABDOMINAL PAIN: 0
SHORTNESS OF BREATH: 0
TREMORS: 0
POLYPHAGIA: 0
NAUSEA: 0
TROUBLE SWALLOWING: 0
POLYDIPSIA: 0
NUMBNESS: 0
FATIGUE: 0
WHEEZING: 0
PALPITATIONS: 0
SINUS PRESSURE: 0
UNEXPECTED WEIGHT CHANGE: 0
CHILLS: 0
SEIZURES: 0
HEADACHES: 1

## 2024-01-11 ENCOUNTER — EVALUATION (OUTPATIENT)
Dept: PHYSICAL THERAPY | Facility: CLINIC | Age: 44
End: 2024-01-11
Payer: COMMERCIAL

## 2024-01-11 DIAGNOSIS — S92.351A CLOSED FRACTURE OF BASE OF FIFTH METATARSAL BONE OF RIGHT FOOT: ICD-10-CM

## 2024-01-11 PROCEDURE — 97161 PT EVAL LOW COMPLEX 20 MIN: CPT | Mod: GP

## 2024-01-11 ASSESSMENT — PAIN SCALES - GENERAL: PAINLEVEL_OUTOF10: 8

## 2024-01-11 ASSESSMENT — PAIN DESCRIPTION - DESCRIPTORS: DESCRIPTORS: BURNING;SHARP

## 2024-01-11 NOTE — PROGRESS NOTES
"Physical Therapy Evaluation and Treatment      Patient Name: Gricelda Red  MRN: [id number]  Today's Date: 2024  Time Calculation  Start Time: 1530  Stop Time: 1610  Time Calculation (min): 40 min          Visit Number:  1 (including evaluation)  Planned total visits: 10  Visit Authorized:  30 then auth  Insurance verification info: $30.00 COPAY PER VISIT/ 100% COVERAGE/ 30 VISITS PER YEAR/ NO AUTH/ SEE FAX BACK PRINT OUT    Current Problem:   1. Closed fracture of base of fifth metatarsal bone of right foot  Referral to Physical Therapy    Follow Up In Physical Therapy        Relevant Imagin23 Xray R foot  \"FINDINGS:  Bones: Redemonstration of a transverse fracture at base of 5th metatarsal. No change in appearance compared to 10/31/2023. Joints: The joints are maintained. Soft tissues: Unremarkable.  IMPRESSION:  Persistent fracture base of 5th metatarsal without interval change in morphology compared to 10/31/2023.\"    Subjective  /General:  General  Reason for Referral: Fracture of base of 5th metatarsal of R foot  Referred By: Pastor Briggs  Patient reported hx of current condition: The pt states that on 10/28/23 she was walking out of her kitchen into the garage, and fell on the step down, landing sideways on her foot. She notes that she went to an urgent care and was given a surgical walking shoe and then later received a walking boot that she wore for about 1 month. She notes that at her most recent physician follow-up on 23 that she was instructed to stop wearing the boot and return to the surgical walking shoe, which she has continued to current. She has been utilizing a SPC when out of the house since the fracture. Next physician follow-up is reported to be tomorrow.     Aggravating factors: wbing >30mins, letting the foot hang down unsupported (increases swelling and burning)   Relieving factors: tylenol, elevating, and ice     Precautions:  Precautions  Precautions Comment: Fall " risk - denies red flags    Red flags   Hx of CA No   Pacemaker/ Electronic Implant No   Saddle Anesthesia No   Bowel/Bladder Changes No   Sudden Weakness No   Recent Falls (within last 6mo) Yes 1 fall as noted in subjective      Pain:  Pain Assessment  Pain Score: 8  Pain Location: Foot  Pain Orientation: Right (lateral foot and dorsal aspect)  Pain Descriptors: Burning, Sharp  Pain Frequency: Intermittent  Clinical Progression: Not changed  Home Living:    Lives with: Family  Home type: House  Stairs: Yes with handrails  Prior Level of Function:  Prior Function Per Pt/Caregiver Report  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 Pt ambulates with antalgic gait on the R LE.  Other:  Comment: Observations: mild edema through the R foot and ankle with slightly increased redness compared to the L LE.     Ankle AROM (in long sitting) L R   Dorsiflexion 5 deg 5 deg*   Plantarflexion 45 deg 45 deg * at end range   Eversion 12 deg 5 deg   Inversion 40 deg 10 deg*   Great toe extension 80 deg 80 deg      Hip MMT L R   Hip Flexion 4+/5 4+/5   External Rotation 4+/5 4+/5   Internal Rotation 4+/5 4+/5      Knee MMT L R   Knee Flexion 5/5 5/5   Knee Extension 4+/5 4+/5      Ankle MMT L R   Dorsiflexion 5/5 4/5   Plantarflexion 5/5 4/5   Eversion 5/5 deferred   Inversion 5/5 5/5      Special Tests:    Figure 8 measure L ankle = 49.75cm; R ankle = 51cm      Outcome Measures:  Other Measures  Lower Extremity Funtional Score (LEFS): 26/80     OP EDUCATION:  Outpatient Education  Individual(s) Educated: Patient  Education Provided: Anatomy, Home Exercise Program, POC  Risk and Benefits  "Discussed with Patient/Caregiver/Other: yes  Patient/Caregiver Demonstrated Understanding: yes  Plan of Care Discussed and Agreed Upon: yes  Education Comment: Intoo[employer] Access Code: [email or web address] - see pt instructions for details    HEP to be completed daily; exercise listed in the pt instructions  Assessment:  PT Assessment Results: Decreased strength, Decreased range of motion, Pain  Rehab Prognosis: Fair    Pt is a 48 y.o. Female who presents with a fracture of the base of the 5th metatarsal on the R LE. The current impairments have led to functional limitations that include: walking and standing, wbing tasks. The pts POC may be complicated d/t high levels of sensitivity that may limit pts ability to participate. Pt would benefit from skilled physical therapy intervention to improve impairments and facilitate return to prior function.    Plan:  Treatment/Interventions: Aquatic therapy, Cryotherapy, Education/ Instruction, Dry needling, Gait training, Hot pack, Manual therapy, Neuromuscular re-education, Taping techniques, Therapeutic activities, Therapeutic exercises  PT Plan: Skilled PT  PT Frequency: Other (Comment) (1x/wk to every other week (pt request d/t pt copay and pt does not drive))  Duration: 12wks  Onset Date: 01/11/24  Certification Period Start Date: 01/11/24  Certification Period End Date: 04/10/24  Number of Treatments Authorized: 30/yr then auth  Rehab Potential: Fair  Plan of Care Agreement: Patient  Pt declined aquatic therapy at this time.     Goals:  Active       PT Problem       PT STG       Start:  01/11/24    Expected End:  02/25/24       - Pt will complete the HEP with <3 verbal cues for correction  - Pt will demonstrate 2pt improvement on the NPRS, allowing for improved tolerance of functional activities.  - Pt will demonstrate ability to perform 5 step ups to 6\" step leading with each LE and with 1 UE support in order to demonstrate improved ability to navigate stairs           PT LTG       " "Start:  01/11/24    Expected End:  04/10/24       - Pt will be independent in HEP & symptom management  - Pt will demonstrate a 4 pt improvement for knee pain on the NPRS, allowing for improved tolerance to perform daily functional activities.   - Pt will demonstrate no losses in ankle AROM without onset of pain, allowing for a normal gait pattern and performance of functional mobility.  - Pt will demonstrate ability to perform 10 sit to stands without UE use in order to demonstrate improved functional LE strength and improved independence with functional mobility  - Pt will demonstrate 5/5 MMT grading throughout LE musculature, allowing for appropriate muscle recruitment during daily activities.   - Pt will demonstrate ability to ambulate at least 350' with least restrictive/no device without breaks or increases in pain in order to demonstrate improved tolerance to prolonged ambulation.  - Pt will perform tandem stance >30\" with EO leading with ea LE in order to demonstrate improved proprioception and stability for improved safety with navigation around obstacles.                    "

## 2024-01-12 ENCOUNTER — ANCILLARY PROCEDURE (OUTPATIENT)
Dept: RADIOLOGY | Facility: CLINIC | Age: 44
End: 2024-01-12
Payer: COMMERCIAL

## 2024-01-12 ENCOUNTER — OFFICE VISIT (OUTPATIENT)
Dept: ORTHOPEDIC SURGERY | Facility: CLINIC | Age: 44
End: 2024-01-12
Payer: COMMERCIAL

## 2024-01-12 DIAGNOSIS — S92.351A CLOSED FRACTURE OF BASE OF FIFTH METATARSAL BONE OF RIGHT FOOT: ICD-10-CM

## 2024-01-12 DIAGNOSIS — S92.351A CLOSED FRACTURE OF BASE OF FIFTH METATARSAL BONE OF RIGHT FOOT: Primary | ICD-10-CM

## 2024-01-12 DIAGNOSIS — S93.491A SPRAIN OF ANTERIOR TALOFIBULAR LIGAMENT OF RIGHT ANKLE, INITIAL ENCOUNTER: ICD-10-CM

## 2024-01-12 PROCEDURE — 73630 X-RAY EXAM OF FOOT: CPT | Mod: RT

## 2024-01-12 PROCEDURE — 1036F TOBACCO NON-USER: CPT | Performed by: STUDENT IN AN ORGANIZED HEALTH CARE EDUCATION/TRAINING PROGRAM

## 2024-01-12 PROCEDURE — 99213 OFFICE O/P EST LOW 20 MIN: CPT | Performed by: STUDENT IN AN ORGANIZED HEALTH CARE EDUCATION/TRAINING PROGRAM

## 2024-01-12 ASSESSMENT — PAIN - FUNCTIONAL ASSESSMENT: PAIN_FUNCTIONAL_ASSESSMENT: 0-10

## 2024-01-12 ASSESSMENT — PAIN SCALES - GENERAL: PAINLEVEL_OUTOF10: 7

## 2024-01-12 ASSESSMENT — PAIN DESCRIPTION - DESCRIPTORS: DESCRIPTORS: ACHING;SORE

## 2024-01-13 NOTE — PROGRESS NOTES
ORTHOPAEDIC SURGERY OUTPATIENT PROGRESS NOTE    Chief Complaint:  Right foot pain    History Of Present Illness  Gricelda Red is a 43 y.o. female who presents for follow-up of right foot pain from [facility], previously known to me for bilateral planter fasciitis.  Patient sustained a twisting injury to her foot from 10/31/2023.  Patient was initially seen in an urgent care setting and later in [facility].  X-rays were obtained that demonstrated base of the fifth metatarsal fracture, she was placed into a walking boot and recommended for foot and ankle orthopedic surgery follow-up.  Patient has been without DVT prophylaxis.  She is reporting severe 10 out of 10 pain in her foot.  Pain is worse with standing and is described as stabbing and sharp.  Patient reports no prior history of twisting injury to this foot or ankle.  She is starting a new job as of next week and has plans to travel for a wedding.    12/04/2023: Patient returns for follow-up of her right foot pain.  Patient has continued ambulating in a walking boot with cane assist.  She is complaining of 9 out of 10 pain.  She has noticed continued swelling and discomfort while weightbearing.  She has returned from [place] and I provided her with a prescription for Eliquis for DVT prophylaxis during travel while in the boot.  She denies any new onset numbness, tingling or weakness but she does report burning pain when ambulating, particularly in the bottom of her foot including her toes.    01/12/2024: Patient returns for follow-up of her right foot pain.  She has recently began physical therapy.  She is reporting significant improvement with respect to her symptoms, typical pain is rated as 7 out of 10.  She has been ambulating with use of a cane.  She has decreased sensitivity to her foot and is quite pleased with her progress at this point.     Past Medical History  Past Medical History:   Diagnosis Date    Personal history of other diseases of the musculoskeletal system  and connective tissue 04/24/2021    History of neck pain       Surgical History  Recent Surgeries in Orthopaedic Surgery            No cases to display             Social History  Social History     Socioeconomic History    Marital status:      Spouse name: Not on file    Number of children: Not on file    Years of education: Not on file    Highest education level: Not on file   Occupational History    Not on file   Tobacco Use    Smoking status: Never    Smokeless tobacco: Never   Substance and Sexual Activity    Alcohol use: Not Currently    Drug use: Not Currently    Sexual activity: Not on file   Other Topics Concern    Not on file   Social History Narrative    Not on file     Social Determinants of Health     Financial Resource Strain: Not on file   Food Insecurity: Not on file   Transportation Needs: Not on file   Physical Activity: Not on file   Stress: Not on file   Social Connections: Not on file   Intimate Partner Violence: Not on file   Housing Stability: Not on file       Family History  Family History   Problem Relation Name Age of Onset    Heart attack Father          Allergies  No Known Allergies    Review of Systems  REVIEW OF SYSTEMS  Constitutional: no unplanned weight loss  Psychiatric: no suicidal ideation  ENT: no vision changes, no sinus problems  Pulmonary: no shortness of breath during office visit today  Lymphatic: no enlarged lymph nodes  Cardiovascular: no chest pain or shortness of breath during office visit today  Gastrointestinal: no stomach problems  Genitourinary: no dysuria   Skin: no rashes  Endocrine: no thyroid problems  Neurological: no headache, no numbness  Hematological: no easy bruising  Musculoskeletal: Right foot pain     Physical Exam  PHYSICAL EXAMINATION  Constitutional Exam: well developed and well nourished  Psychiatric Exam: alert and oriented, appropriate mood and behavior  Eye Exam: EOMI  Pulmonary Exam: breathing non-labored, no apparent distress  Lymphatic  exam: no appreciable lymphadenopathy in the lower extremities  Cardiovascular exam: RRR to peripheral palpation, DP pulses 2+, PT 2+, toes are pink with good capillary refill, no pitting edema  Skin exam: no open lesions, rashes, abrasions or ulcerations  Neurological exam: sensation to light touch intact in both lower extremities in peripheral and dermatomal distributions (except for any abnormalities noted in musculoskeletal exam)    Musculoskeletal exam: Right lower extremity examination.  Patient pain localized to the base of the fifth metatarsal, she is minimally tender to palpation there on examination today.  There is significant decrease in dorsal soft tissue swelling as well as diminished allodynia and pain out of proportion to examination.  Patient minimally tender to palpation overlying the ATFL.  Patient has supple and pain-free ankle, subtalar and midtarsal joint range of motion. Patient has sensation intact light touch grossly in a saphenous, sural, superficial peroneal, deep peroneal and tibial nerve distribution.  Patient has intact PF/DF/EHL with 2+ DP/PT pulse palpated.     Last Recorded Vitals  There were no vitals taken for this visit.    Laboratory Results  No results found for this or any previous visit (from the past 24 hour(s)).     Radiology Results  X-ray imaging 3 view weightbearing right foot reviewed from 01/12/2024 and independently evaluated by me demonstrates increased callus about base of fifth metatarsal avulsion injury suggestive of healing.    Assessment/Plan:  43-year-old female who in my impression has a right base of fifth metatarsal fracture in the setting of ankle sprain involving ATFL with both clinical and radiographic evidence of healing.  I have reviewed the diagnosis and treatment options with the patient.  In my impression the patient should continue weightbearing as tolerated in her right lower extremity and transition out of the walking shoe and away from assistive  devices including cane.  She should continue with her physical therapy and utilize OTC NSAIDs for pain control.  I will plan to see the patient back in approximately 6 weeks for repeat clinical and radiographic evaluation.  Upon return, patient will require 3 views weightbearing right foot.    Pastor Briggs MD, MARTÍN  Department of Orthopaedic Surgery  [facility]    The diagnosis and treatment plan were reviewed with the patient. All questions were answered. The patient verbalized understanding of the treatment plan. There were no barriers to understanding identified.    Note dictated with [employer] software.  Completed without full type editing and sent to avoid delay.

## 2024-01-19 ENCOUNTER — APPOINTMENT (OUTPATIENT)
Dept: ORTHOPEDIC SURGERY | Facility: CLINIC | Age: 44
End: 2024-01-19
Payer: COMMERCIAL

## 2024-03-15 ENCOUNTER — HOSPITAL ENCOUNTER (OUTPATIENT)
Dept: RADIOLOGY | Facility: CLINIC | Age: 44
Discharge: HOME | End: 2024-03-15
Payer: COMMERCIAL

## 2024-03-15 ENCOUNTER — OFFICE VISIT (OUTPATIENT)
Dept: ORTHOPEDIC SURGERY | Facility: CLINIC | Age: 44
End: 2024-03-15
Payer: COMMERCIAL

## 2024-03-15 DIAGNOSIS — S92.351A CLOSED FRACTURE OF BASE OF FIFTH METATARSAL BONE OF RIGHT FOOT: ICD-10-CM

## 2024-03-15 PROCEDURE — 73630 X-RAY EXAM OF FOOT: CPT | Mod: RT

## 2024-03-15 PROCEDURE — 73630 X-RAY EXAM OF FOOT: CPT | Mod: RIGHT SIDE | Performed by: RADIOLOGY

## 2024-03-15 PROCEDURE — 99213 OFFICE O/P EST LOW 20 MIN: CPT | Performed by: STUDENT IN AN ORGANIZED HEALTH CARE EDUCATION/TRAINING PROGRAM

## 2024-03-15 PROCEDURE — 1036F TOBACCO NON-USER: CPT | Performed by: STUDENT IN AN ORGANIZED HEALTH CARE EDUCATION/TRAINING PROGRAM

## 2024-03-15 ASSESSMENT — PAIN DESCRIPTION - DESCRIPTORS: DESCRIPTORS: ACHING;DISCOMFORT;DULL

## 2024-03-15 ASSESSMENT — PAIN - FUNCTIONAL ASSESSMENT: PAIN_FUNCTIONAL_ASSESSMENT: 0-10

## 2024-03-15 ASSESSMENT — PAIN SCALES - GENERAL: PAINLEVEL_OUTOF10: 3

## 2024-03-16 NOTE — PROGRESS NOTES
ORTHOPAEDIC SURGERY OUTPATIENT PROGRESS NOTE    Chief Complaint:  Right foot pain    History Of Present Illness  Gricelda Red is a 43 y.o. female who presents for follow-up of right foot pain from [facility], previously known to me for bilateral planter fasciitis.  Patient sustained a twisting injury to her foot from 10/31/2023.  Patient was initially seen in an urgent care setting and later in [facility].  X-rays were obtained that demonstrated base of the fifth metatarsal fracture, she was placed into a walking boot and recommended for foot and ankle orthopedic surgery follow-up.  Patient has been without DVT prophylaxis.  She is reporting severe 10 out of 10 pain in her foot.  Pain is worse with standing and is described as stabbing and sharp.  Patient reports no prior history of twisting injury to this foot or ankle.  She is starting a new job as of next week and has plans to travel for a wedding.    12/04/2023: Patient returns for follow-up of her right foot pain.  Patient has continued ambulating in a walking boot with cane assist.  She is complaining of 9 out of 10 pain.  She has noticed continued swelling and discomfort while weightbearing.  She has returned from [place] and I provided her with a prescription for Eliquis for DVT prophylaxis during travel while in the boot.  She denies any new onset numbness, tingling or weakness but she does report burning pain when ambulating, particularly in the bottom of her foot including her toes.    01/12/2024: Patient returns for follow-up of her right foot pain.  She has recently began physical therapy.  She is reporting significant improvement with respect to her symptoms, typical pain is rated as 7 out of 10.  She has been ambulating with use of a cane.  She has decreased sensitivity to her foot and is quite pleased with her progress at this point.    03/15/2024: Patient returns for follow-up of her right foot pain.  She has had significant interval improvement with respect to her  pain overall.  She is currently reporting 3 out of 10 pain that is gradually improving.  She is ambulating without the use of a assistive device.  She denies new numbness, tingling or weakness.  The     Past Medical History  Past Medical History:   Diagnosis Date    Personal history of other diseases of the musculoskeletal system and connective tissue 04/24/2021    History of neck pain       Surgical History  Recent Surgeries in Orthopaedic Surgery            No cases to display             Social History  Social History     Socioeconomic History    Marital status:      Spouse name: Not on file    Number of children: Not on file    Years of education: Not on file    Highest education level: Not on file   Occupational History    Not on file   Tobacco Use    Smoking status: Never    Smokeless tobacco: Never   Substance and Sexual Activity    Alcohol use: Not Currently    Drug use: Not Currently    Sexual activity: Not on file   Other Topics Concern    Not on file   Social History Narrative    Not on file     Social Determinants of Health     Financial Resource Strain: Not on file   Food Insecurity: Not on file   Transportation Needs: Not on file   Physical Activity: Not on file   Stress: Not on file   Social Connections: Not on file   Intimate Partner Violence: Not on file   Housing Stability: Not on file       Family History  Family History   Problem Relation Name Age of Onset    Heart attack Father          Allergies  No Known Allergies    Review of Systems  REVIEW OF SYSTEMS  Constitutional: no unplanned weight loss  Psychiatric: no suicidal ideation  ENT: no vision changes, no sinus problems  Pulmonary: no shortness of breath during office visit today  Lymphatic: no enlarged lymph nodes  Cardiovascular: no chest pain or shortness of breath during office visit today  Gastrointestinal: no stomach problems  Genitourinary: no dysuria   Skin: no rashes  Endocrine: no thyroid problems  Neurological: no headache,  no numbness  Hematological: no easy bruising  Musculoskeletal: Right foot pain     Physical Exam  PHYSICAL EXAMINATION  Constitutional Exam: well developed and well nourished  Psychiatric Exam: alert and oriented, appropriate mood and behavior  Eye Exam: EOMI  Pulmonary Exam: breathing non-labored, no apparent distress  Lymphatic exam: no appreciable lymphadenopathy in the lower extremities  Cardiovascular exam: RRR to peripheral palpation, DP pulses 2+, PT 2+, toes are pink with good capillary refill, no pitting edema  Skin exam: no open lesions, rashes, abrasions or ulcerations  Neurological exam: sensation to light touch intact in both lower extremities in peripheral and dermatomal distributions (except for any abnormalities noted in musculoskeletal exam)    Musculoskeletal exam: Right lower extremity examination.  Patient pain previously localized to the base of the fifth metatarsal.  She is minimally tender to palpation there on examination today.  She has had near complete resolution of allodynia and pain out of proportion to examination.  She is nontender to palpation at the ATFL. Patient has supple and pain-free ankle, subtalar and midtarsal joint range of motion. Patient has sensation intact light touch grossly in a saphenous, sural, superficial peroneal, deep peroneal and tibial nerve distribution.  Patient has intact PF/DF/EHL with 2+ DP/PT pulse palpated.     Last Recorded Vitals  There were no vitals taken for this visit.    Laboratory Results  No results found for this or any previous visit (from the past 24 hour(s)).     Radiology Results  X-ray imaging 3 view weightbearing right foot reviewed from 03/15/2024 and independently evaluated by me demonstrates interval healing about base of fifth metatarsal avulsion fracture.    Assessment/Plan:  43-year-old female who in my impression has a right base of fifth metatarsal fracture with both clinical and radiographic evidence of healing.  I have reviewed  the diagnosis and treatment options extensively with the patient.  In my impression the patient may continue weightbearing as tolerated in her right lower extremity.  I have no restrictions for her at this time point.  I will plan to see the patient back in approximately 6 months for repeat clinical and radiographic evaluation.  I have encouraged the patient to contact the office if she develops any new pain, worsening symptoms if she has any further questions.  Upon return, patient will require 3 view weightbearing right foot.    Pastor Briggs MD, MARTÍN  Department of Orthopaedic Surgery  [facility]    The diagnosis and treatment plan were reviewed with the patient. All questions were answered. The patient verbalized understanding of the treatment plan. There were no barriers to understanding identified.    Note dictated with [employer] software.  Completed without full type editing and sent to avoid delay.

## 2024-03-22 ENCOUNTER — DOCUMENTATION (OUTPATIENT)
Dept: PHYSICAL THERAPY | Facility: CLINIC | Age: 44
End: 2024-03-22
Payer: COMMERCIAL

## 2024-03-22 NOTE — PROGRESS NOTES
Physical Therapy    Discharge Summary    Name: Gricelda Red  MRN: [id number]  : 1980  Date: 24    Discharge Summary: PT    Discharge Information: Date of discharge 3/22/24, Date of last visit 24, Date of evaluation 24, Number of attended visits 1, Referred by Pastor Briggs, and Referred for Closed fracture of the R 5th metatarsal     Therapy Summary: The pt attended a PT initial evaluation on 24, then did not schedule follow-up sessions.     Rehab Discharge Reason: Failed to schedule and/or keep follow-up appointment(s)

## 2024-04-16 ENCOUNTER — HOSPITAL ENCOUNTER (OUTPATIENT)
Dept: RADIOLOGY | Facility: HOSPITAL | Age: 44
Discharge: HOME | End: 2024-04-16
Payer: COMMERCIAL

## 2024-04-16 ENCOUNTER — OFFICE VISIT (OUTPATIENT)
Dept: PRIMARY CARE | Facility: CLINIC | Age: 44
End: 2024-04-16
Payer: COMMERCIAL

## 2024-04-16 VITALS
BODY MASS INDEX: 32.6 KG/M2 | TEMPERATURE: 97.3 F | OXYGEN SATURATION: 99 % | HEART RATE: 79 BPM | SYSTOLIC BLOOD PRESSURE: 130 MMHG | HEIGHT: 63 IN | DIASTOLIC BLOOD PRESSURE: 82 MMHG | WEIGHT: 184 LBS

## 2024-04-16 DIAGNOSIS — M79.605 LEFT LEG PAIN: ICD-10-CM

## 2024-04-16 DIAGNOSIS — M25.562 ACUTE PAIN OF LEFT KNEE: ICD-10-CM

## 2024-04-16 DIAGNOSIS — M79.605 LEFT LEG PAIN: Primary | ICD-10-CM

## 2024-04-16 PROCEDURE — 73562 X-RAY EXAM OF KNEE 3: CPT | Mod: LEFT SIDE | Performed by: RADIOLOGY

## 2024-04-16 PROCEDURE — 99214 OFFICE O/P EST MOD 30 MIN: CPT | Performed by: INTERNAL MEDICINE

## 2024-04-16 PROCEDURE — 93971 EXTREMITY STUDY: CPT | Performed by: RADIOLOGY

## 2024-04-16 PROCEDURE — 73562 X-RAY EXAM OF KNEE 3: CPT | Mod: LT

## 2024-04-16 PROCEDURE — 93971 EXTREMITY STUDY: CPT

## 2024-04-16 RX ORDER — CHOLECALCIFEROL (VITAMIN D3) 25 MCG
1000 TABLET ORAL DAILY
COMMUNITY

## 2024-04-16 ASSESSMENT — PATIENT HEALTH QUESTIONNAIRE - PHQ9
1. LITTLE INTEREST OR PLEASURE IN DOING THINGS: NOT AT ALL
SUM OF ALL RESPONSES TO PHQ9 QUESTIONS 1 AND 2: 0
2. FEELING DOWN, DEPRESSED OR HOPELESS: NOT AT ALL

## 2024-04-16 ASSESSMENT — ENCOUNTER SYMPTOMS
DEPRESSION: 0
LOSS OF SENSATION IN FEET: 0
OCCASIONAL FEELINGS OF UNSTEADINESS: 0

## 2024-04-16 ASSESSMENT — COLUMBIA-SUICIDE SEVERITY RATING SCALE - C-SSRS
6. HAVE YOU EVER DONE ANYTHING, STARTED TO DO ANYTHING, OR PREPARED TO DO ANYTHING TO END YOUR LIFE?: NO
2. HAVE YOU ACTUALLY HAD ANY THOUGHTS OF KILLING YOURSELF?: NO
1. IN THE PAST MONTH, HAVE YOU WISHED YOU WERE DEAD OR WISHED YOU COULD GO TO SLEEP AND NOT WAKE UP?: NO

## 2024-04-16 ASSESSMENT — PAIN SCALES - GENERAL: PAINLEVEL: 9

## 2024-04-16 NOTE — PROGRESS NOTES
"Subjective   Patient ID: Gricelda Red is a 43 y.o. female who presents for 3 month f/u (Pt would like orthopedic referral for left leg pain).    HPI     Has history of migraine headaches, takes Excedrin as needed.  Stated has migraine headaches especially during her menstrual cycle.  Feels dizzy whenever she has headache.  Had fractured base of 5th metatarsal of right foot, she missed a step in October of 2023, recent x ray showed persistent fracture base of 5th metatarsal.  Following with podiatrist  Planing of heavy menstrual cycles, seen in nurse practitioner, wants to see somebody closer to her house    Complaining of left leg pain since last one week. Pain is in left thigh radiating towards left ankle, has pain behind her left knee and left calf.  Stated her left leg is swollen.    Denied any fall, has pain when she tries to move her knee     Review of Systems   Constitutional:  Negative for chills, fatigue and unexpected weight change.   HENT:  Negative for postnasal drip, sinus pressure and trouble swallowing.    Respiratory:  Negative for cough, shortness of breath and wheezing.    Cardiovascular:  Negative for chest pain, palpitations and leg swelling.   Gastrointestinal:  Negative for abdominal pain, blood in stool, nausea and vomiting.   Endocrine: Negative for polydipsia, polyphagia and polyuria.   Genitourinary:  Negative for dysuria and frequency.   Musculoskeletal:  Negative for back pain and myalgias.        Left leg pain   Skin:  Negative for rash.   Neurological:  Positive for headaches. Negative for tremors, seizures and numbness.   Psychiatric/Behavioral:  Negative for behavioral problems.        Objective   /82 (BP Location: Left arm, Patient Position: Sitting, BP Cuff Size: Large adult)   Pulse 79   Temp 36.3 °C (97.3 °F) (Temporal)   Ht 1.6 m (5' 3\")   Wt 83.5 kg (184 lb)   SpO2 99%   BMI 32.59 kg/m²     Physical Exam  Constitutional:       General: She is not in acute " distress.  HENT:      Head: Normocephalic and atraumatic.   Cardiovascular:      Rate and Rhythm: Normal rate and regular rhythm.      Heart sounds: No murmur heard.  Pulmonary:      Effort: Pulmonary effort is normal. No respiratory distress.      Breath sounds: Normal breath sounds.   Abdominal:      General: Abdomen is flat. Bowel sounds are normal.      Palpations: Abdomen is soft.   Musculoskeletal:      Comments: Mild edema bilaterally, varicose veins present in both lower extremities, left knee range of motion reduced, tenderness with extension and rotation of knee, no palpable cyst or swelling in popliteal fossa   Neurological:      Mental Status: She is alert and oriented to person, place, and time.      Cranial Nerves: No cranial nerve deficit.         Varicose veins bilaterally  Assessment/Plan        Gricelda was seen today for 3 month f/u.  Diagnoses and all orders for this visit:  Left leg pain (Primary)  -     Lower extremity venous duplex left; Future  -     predniSONE (Deltasone) 20 mg tablet; Take 2 tablets (40 mg) by mouth once daily for 5 days.  -     Referral to Physical Therapy; Future  Acute pain of left knee  -     XR knee left 3 views; Future       Current Outpatient Medications   Medication Instructions    cholecalciferol (VITAMIN D3) 1,000 Units, oral, Daily    predniSONE (DELTASONE) 40 mg, oral, Daily    SUMAtriptan (IMITREX) 50 mg, oral, Once as needed, May repeat after 2 hours.

## 2024-04-17 RX ORDER — PREDNISONE 20 MG/1
40 TABLET ORAL DAILY
Qty: 10 TABLET | Refills: 0 | Status: SHIPPED | OUTPATIENT
Start: 2024-04-17 | End: 2024-04-22

## 2024-04-17 ASSESSMENT — ENCOUNTER SYMPTOMS
BLOOD IN STOOL: 0
VOMITING: 0
COUGH: 0
NUMBNESS: 0
SHORTNESS OF BREATH: 0
SINUS PRESSURE: 0
POLYDIPSIA: 0
WHEEZING: 0
SEIZURES: 0
NAUSEA: 0
MYALGIAS: 0
FREQUENCY: 0
TREMORS: 0
POLYPHAGIA: 0
BACK PAIN: 0
PALPITATIONS: 0
UNEXPECTED WEIGHT CHANGE: 0
HEADACHES: 1
DYSURIA: 0
FATIGUE: 0
CHILLS: 0
TROUBLE SWALLOWING: 0
ABDOMINAL PAIN: 0

## 2024-06-15 ENCOUNTER — APPOINTMENT (OUTPATIENT)
Dept: RADIOLOGY | Facility: HOSPITAL | Age: 44
End: 2024-06-15
Payer: COMMERCIAL

## 2024-06-24 ENCOUNTER — TELEPHONE (OUTPATIENT)
Dept: PRIMARY CARE | Facility: CLINIC | Age: 44
End: 2024-06-24
Payer: COMMERCIAL

## 2024-06-24 DIAGNOSIS — I83.90 VARICOSE VEINS OF LOWER EXTREMITY, UNSPECIFIED LATERALITY, UNSPECIFIED WHETHER COMPLICATED: Primary | ICD-10-CM

## 2024-07-06 ENCOUNTER — HOSPITAL ENCOUNTER (OUTPATIENT)
Dept: RADIOLOGY | Facility: HOSPITAL | Age: 44
Discharge: HOME | End: 2024-07-06
Payer: COMMERCIAL

## 2024-07-06 VITALS — WEIGHT: 180 LBS | BODY MASS INDEX: 31.89 KG/M2 | HEIGHT: 63 IN

## 2024-07-06 DIAGNOSIS — Z12.31 VISIT FOR SCREENING MAMMOGRAM: ICD-10-CM

## 2024-07-06 PROCEDURE — 77067 SCR MAMMO BI INCL CAD: CPT

## 2024-07-11 ENCOUNTER — LAB (OUTPATIENT)
Dept: LAB | Facility: LAB | Age: 44
End: 2024-07-11
Payer: COMMERCIAL

## 2024-07-11 ENCOUNTER — APPOINTMENT (OUTPATIENT)
Dept: OBSTETRICS AND GYNECOLOGY | Facility: CLINIC | Age: 44
End: 2024-07-11
Payer: COMMERCIAL

## 2024-07-11 VITALS — SYSTOLIC BLOOD PRESSURE: 132 MMHG | BODY MASS INDEX: 32.42 KG/M2 | DIASTOLIC BLOOD PRESSURE: 78 MMHG | WEIGHT: 183 LBS

## 2024-07-11 DIAGNOSIS — R10.2 PELVIC PAIN IN FEMALE: Primary | ICD-10-CM

## 2024-07-11 DIAGNOSIS — N92.0 MENORRHAGIA WITH REGULAR CYCLE: ICD-10-CM

## 2024-07-11 DIAGNOSIS — R10.2 PELVIC PAIN IN FEMALE: ICD-10-CM

## 2024-07-11 DIAGNOSIS — N94.6 DYSMENORRHEA: ICD-10-CM

## 2024-07-11 PROCEDURE — 1036F TOBACCO NON-USER: CPT | Performed by: MIDWIFE

## 2024-07-11 PROCEDURE — 36415 COLL VENOUS BLD VENIPUNCTURE: CPT

## 2024-07-11 PROCEDURE — 99213 OFFICE O/P EST LOW 20 MIN: CPT | Performed by: MIDWIFE

## 2024-07-11 PROCEDURE — 83002 ASSAY OF GONADOTROPIN (LH): CPT

## 2024-07-11 PROCEDURE — 83001 ASSAY OF GONADOTROPIN (FSH): CPT

## 2024-07-11 PROCEDURE — 84443 ASSAY THYROID STIM HORMONE: CPT

## 2024-07-11 NOTE — PROGRESS NOTES
Subjective   Patient ID: Gricelda Red is a 44 y.o. female  who presents for irregular periods.  Pt says that for the past 3 mos menses are longer and heavier and more painful. Menses last 6 days with last day heaviest bleeding-- goes through a pad and hour on last day.  Pt also c/o x.1 yr lower abd pain that starts 7 days before onset menses and lasts until menses end.  Pt says this pain is an 8 on  1-10 pain scale. Pt also c/o wt gain recently. (US ordered 2023 was not obtained by pt she says)    HPI  PMHx: last pap  NIL Neg; mammogram  Neg  SocH:  24 yrs  ROS: NAD, no urinary c/o, no vaginal c/o  Review of Systems   Genitourinary:  Positive for menstrual problem, pelvic pain and vaginal bleeding.       Objective   Physical Exam  Constitutional:       Appearance: Normal appearance. She is obese.   HENT:      Head: Normocephalic.   Pulmonary:      Effort: Pulmonary effort is normal.   Neurological:      Mental Status: She is alert.   Psychiatric:         Behavior: Behavior normal.         Thought Content: Thought content normal.         Assessment/Plan   Diagnoses and all orders for this visit:  Pelvic pain in female  -     Follicle Stimulating Hormone; Future  -     Thyroid Stimulating Hormone; Future  -     Luteinizing Hormone; Future  -     US PELVIS TRANSABDOMINAL WITH TRANSVAGINAL; Future  Dysmenorrhea  -     Follicle Stimulating Hormone; Future  -     Thyroid Stimulating Hormone; Future  -     Luteinizing Hormone; Future  -     US PELVIS TRANSABDOMINAL WITH TRANSVAGINAL; Future  Menorrhagia with regular cycle  -     Follicle Stimulating Hormone; Future  -     Thyroid Stimulating Hormone; Future  -     Luteinizing Hormone; Future  -     US PELVIS TRANSABDOMINAL WITH TRANSVAGINAL; Future    We discussed palliative measures for pelvic pain-- pt cannot take advil anymore d/t stomach upset.   She is advised to RTO for FU with Dr Jha after US and  lab work is obtained  Pt is advised to also FU  with PCP for support with wt loss         YEIMI Bello, ND 07/11/24 11:06 AM

## 2024-07-12 ENCOUNTER — HOSPITAL ENCOUNTER (OUTPATIENT)
Dept: RADIOLOGY | Facility: HOSPITAL | Age: 44
Discharge: HOME | End: 2024-07-12
Payer: COMMERCIAL

## 2024-07-12 DIAGNOSIS — R10.2 PELVIC PAIN IN FEMALE: ICD-10-CM

## 2024-07-12 DIAGNOSIS — N92.0 MENORRHAGIA WITH REGULAR CYCLE: ICD-10-CM

## 2024-07-12 DIAGNOSIS — N94.6 DYSMENORRHEA: ICD-10-CM

## 2024-07-12 LAB
FSH SERPL-ACNC: 7 IU/L
LH SERPL-ACNC: 7 IU/L
TSH SERPL-ACNC: 1.09 MIU/L (ref 0.44–3.98)

## 2024-07-12 PROCEDURE — 76856 US EXAM PELVIC COMPLETE: CPT

## 2024-08-06 ENCOUNTER — PREP FOR PROCEDURE (OUTPATIENT)
Dept: OBSTETRICS AND GYNECOLOGY | Facility: HOSPITAL | Age: 44
End: 2024-08-06

## 2024-08-06 ENCOUNTER — LAB (OUTPATIENT)
Dept: LAB | Facility: LAB | Age: 44
End: 2024-08-06
Payer: COMMERCIAL

## 2024-08-06 ENCOUNTER — OFFICE VISIT (OUTPATIENT)
Dept: VASCULAR SURGERY | Facility: CLINIC | Age: 44
End: 2024-08-06
Payer: COMMERCIAL

## 2024-08-06 ENCOUNTER — APPOINTMENT (OUTPATIENT)
Dept: OBSTETRICS AND GYNECOLOGY | Facility: CLINIC | Age: 44
End: 2024-08-06
Payer: COMMERCIAL

## 2024-08-06 VITALS
HEIGHT: 62 IN | DIASTOLIC BLOOD PRESSURE: 80 MMHG | SYSTOLIC BLOOD PRESSURE: 128 MMHG | BODY MASS INDEX: 33.68 KG/M2 | WEIGHT: 183 LBS

## 2024-08-06 VITALS
BODY MASS INDEX: 33.68 KG/M2 | DIASTOLIC BLOOD PRESSURE: 96 MMHG | WEIGHT: 183 LBS | SYSTOLIC BLOOD PRESSURE: 152 MMHG | RESPIRATION RATE: 18 BRPM | HEIGHT: 62 IN

## 2024-08-06 DIAGNOSIS — D25.9 UTERINE LEIOMYOMA, UNSPECIFIED LOCATION: Primary | ICD-10-CM

## 2024-08-06 DIAGNOSIS — I83.90 VARICOSE VEINS OF LOWER EXTREMITY, UNSPECIFIED LATERALITY, UNSPECIFIED WHETHER COMPLICATED: ICD-10-CM

## 2024-08-06 DIAGNOSIS — R93.89 ABNORMAL PELVIC ULTRASOUND: ICD-10-CM

## 2024-08-06 DIAGNOSIS — N92.4 EXCESSIVE BLEEDING IN PREMENOPAUSAL PERIOD: ICD-10-CM

## 2024-08-06 DIAGNOSIS — N93.9 ABNORMAL UTERINE BLEEDING: Primary | ICD-10-CM

## 2024-08-06 DIAGNOSIS — D25.9 UTERINE LEIOMYOMA, UNSPECIFIED LOCATION: ICD-10-CM

## 2024-08-06 PROCEDURE — 99212 OFFICE O/P EST SF 10 MIN: CPT | Performed by: SURGERY

## 2024-08-06 PROCEDURE — 36415 COLL VENOUS BLD VENIPUNCTURE: CPT

## 2024-08-06 PROCEDURE — 85027 COMPLETE CBC AUTOMATED: CPT

## 2024-08-06 PROCEDURE — 1036F TOBACCO NON-USER: CPT | Performed by: OBSTETRICS & GYNECOLOGY

## 2024-08-06 PROCEDURE — 99214 OFFICE O/P EST MOD 30 MIN: CPT | Performed by: OBSTETRICS & GYNECOLOGY

## 2024-08-06 PROCEDURE — 1036F TOBACCO NON-USER: CPT | Performed by: SURGERY

## 2024-08-06 PROCEDURE — 3008F BODY MASS INDEX DOCD: CPT | Performed by: OBSTETRICS & GYNECOLOGY

## 2024-08-06 PROCEDURE — 3008F BODY MASS INDEX DOCD: CPT | Performed by: SURGERY

## 2024-08-06 RX ORDER — ACETAMINOPHEN 325 MG/1
975 TABLET ORAL ONCE
OUTPATIENT
Start: 2024-08-06 | End: 2024-08-06

## 2024-08-06 RX ORDER — CELECOXIB 50 MG/1
400 CAPSULE ORAL ONCE
OUTPATIENT
Start: 2024-08-06 | End: 2024-08-06

## 2024-08-06 RX ORDER — GABAPENTIN 600 MG/1
600 TABLET ORAL ONCE
OUTPATIENT
Start: 2024-08-06 | End: 2024-08-06

## 2024-08-06 ASSESSMENT — LIFESTYLE VARIABLES
AUDIT-C TOTAL SCORE: 0
HOW MANY STANDARD DRINKS CONTAINING ALCOHOL DO YOU HAVE ON A TYPICAL DAY: PATIENT DOES NOT DRINK
HOW OFTEN DO YOU HAVE SIX OR MORE DRINKS ON ONE OCCASION: NEVER
HOW OFTEN DO YOU HAVE A DRINK CONTAINING ALCOHOL: NEVER
SKIP TO QUESTIONS 9-10: 1

## 2024-08-06 ASSESSMENT — ENCOUNTER SYMPTOMS
DEPRESSION: 0
LOSS OF SENSATION IN FEET: 0
OCCASIONAL FEELINGS OF UNSTEADINESS: 0

## 2024-08-06 ASSESSMENT — PAIN SCALES - GENERAL: PAINLEVEL: 0-NO PAIN

## 2024-08-06 NOTE — PROGRESS NOTES
Patient comes for follow-up of varicose veins.  They are quite painful for as they are cause severe discomfort in both legs when she sits for long period of time and limit her ability to walk for a while.  After some walking the pain improves.  This is quite typical of venous insufficiency.  I strongly urged her to start wearing knee-high compression stockings.  We do know that she has reflux disease and had recommended ablation of her saphenous which are refluxing on both sides and the left lesser saphenous as well.  Unfortunately we do not not have the equipment here at [facility] currently and I recommended our vein center and brought [facility].  She does not wish to travel that far so I will arrange for her to be contacted by my former partner Dr. David Coffey at [facility].    Total time with patient was 15 minutes.

## 2024-08-06 NOTE — PROGRESS NOTES
Subjective   Patient ID: Camilo Red is a 44 y.o. female who presents for Follow-up.  Review of Dorcas last office note;    YUE Bello-AMANDA, ND   Midwife  Obstetrics and Gynecology     Progress Notes     Signed     Encounter Date: 2024    Signed     Expand All Collapse All       Subjective  Patient ID: Gricelda Red is a 44 y.o. female  who presents for irregular periods.  Pt says that for the past 3 mos menses are longer and heavier and more painful. Menses last 6 days with last day heaviest bleeding-- goes through a pad and hour on last day.  Pt also c/o x.1 yr lower abd pain that starts 7 days before onset menses and lasts until menses end.  Pt says this pain is an 8 on  1-10 pain scale. Pt also c/o wt gain recently. (US ordered 2023 was not obtained by pt she says)     HPI  PMHx: last pap  NIL Neg; mammogram  Neg  SocH:  24 yrs  ROS: NAD, no urinary c/o, no vaginal c/o  Review of Systems   Genitourinary:  Positive for menstrual problem, pelvic pain and vaginal bleeding.               Objective  Physical Exam  Constitutional:       Appearance: Normal appearance. She is obese.   HENT:      Head: Normocephalic.   Pulmonary:      Effort: Pulmonary effort is normal.   Neurological:      Mental Status: She is alert.   Psychiatric:         Behavior: Behavior normal.         Thought Content: Thought content normal.                  Assessment/Plan  Diagnoses and all orders for this visit:  Pelvic pain in female  -     Follicle Stimulating Hormone; Future  -     Thyroid Stimulating Hormone; Future  -     Luteinizing Hormone; Future  -     US PELVIS TRANSABDOMINAL WITH TRANSVAGINAL; Future  Dysmenorrhea  -     Follicle Stimulating Hormone; Future  -     Thyroid Stimulating Hormone; Future  -     Luteinizing Hormone; Future  -     US PELVIS TRANSABDOMINAL WITH TRANSVAGINAL; Future  Menorrhagia with regular cycle  -     Follicle Stimulating Hormone; Future  -     Thyroid Stimulating Hormone;  Future  -     Luteinizing Hormone; Future  -     US PELVIS TRANSABDOMINAL WITH TRANSVAGINAL; Future     We discussed palliative measures for pelvic pain-- pt cannot take advil anymore d/t stomach upset.   She is advised to RTO for FU with Dr Jha after US and  lab work is obtained  Pt is advised to also FU with PCP for support with wt loss        Yvonne Kamara, APRN-CNM, ND 07/11/24     Review of pelvic ultrasound;  US PELVIS TRANSABDOMINAL WITH TRANSVAGINAL  Status: Final result    Study Result    Narrative & Impression  Interpreted By:  Melanie Corado,   STUDY:  US PELVIS TRANSABDOMINAL WITH TRANSVAGINAL; 7/12/2024 5:11 pm      INDICATION:  Signs/Symptoms:pelvic pain.      COMPARISON:  12/18/2020      ACCESSION NUMBER(S):  [id number]      ORDERING CLINICIAN:  YVONNE KAMARA      TECHNIQUE:  Grayscale and color Doppler imaging of the pelvis were performed.  Transabdominal technique was utilized as well as transvaginal  ultrasound to better visualize the adnexa. Duplex Doppler  interrogation including color flow and spectral waveform analysis is  performed due to the patient's complaint of pain.      FINDINGS:  Uterus:  Size: 8.1 cm x 4.8 cm x 7.1 cm. There is a 3.2 x 3.4 x 3.6 cm solid  heterogeneous intramural mass within left side of the uterine fundus.  Endometrial Thickness: 0.9 cm. There is a roughly 1-1/2 x 2.4 cm  mixed cystic and solid lesion seen within the endometrium. Ovaries:  Right ovary: 3.2 cm x 1.9 cm x 3.2 cm.  Right ovary volume: 10.1 ml  Left ovary: 3.1 cm x 2.0 cm x 3.2 cm. There is a 1.4 x 1.4 x 1.5 cm  corpus luteum within left ovary. Left ovary volume: 10.5 ml      Duplex Doppler interrogation including color flow and spectral  waveform analysis shows normal arterial and venous flow within each  ovary without torsion.      There is no free fluid in the pelvis.      IMPRESSION:  3.2 x 3.4 x 3.6 cm intramural leiomyoma arising from the left side of  the uterine fundus which has increased in  size since prior study at  which time it measured 1.4 x 1.4 x 1.5 cm.      There is a roughly 1-1/2 by 2.4 cm mixed cystic and solid lesion  identified within the endometrium which could represent submucosal  leiomyoma, polyp, or perhaps cystic endometrial hyperplasia.  Endometrial biopsy may be beneficial for further assessment.      MACRO:  Critical Finding:  See findings. Notification was initiated on  7/13/2024 at 8:43 am by  Melanie Corado.  (**-YCF-**) Instructions:      Signed by: Melanie Corado 7/13/2024 8:43 AM  Dictation workstation:   [id number]    Established patient 44 years old.  1 child born vaginally.  20 years old.  Turning 21 this August.  She has had 1 year of worsening menses where she is noticing more bleeding cramps odor around her menses.  I reviewed her ultrasound.  It does show a fibroid but it also shows an abnormality in the endometrium.  I reviewed her hormonal assays that were normal.  Will recommend outpatient hysteroscopy D&C.  Reviewed procedure risk benefits complications recovery once we have results we have a number of options to help her bleeding as long as the pathology is benign.  Check CBC        Review of Systems   Genitourinary:  Positive for menstrual problem.       Objective   Physical Exam  Constitutional:       Appearance: Normal appearance. She is normal weight.   Neurological:      Mental Status: She is alert.         Assessment/Plan   Para 1.  44 years old with abnormal bleeding.  Ultrasound shows fibroid and mass within the endometrium.  Obtain CBC.  Hormonal assays normal.  Organize outpatient hysteroscopy D&C.  Reviewed procedure risk benefits complications recovery.  Once we have results from D&C can offer numerous options to help her abnormal menses.  No medical allergies         Wilfrid Jha MD 08/06/24 2:57 PM

## 2024-08-07 ENCOUNTER — APPOINTMENT (OUTPATIENT)
Dept: VASCULAR SURGERY | Facility: CLINIC | Age: 44
End: 2024-08-07
Payer: COMMERCIAL

## 2024-08-07 LAB
ERYTHROCYTE [DISTWIDTH] IN BLOOD BY AUTOMATED COUNT: 12.7 % (ref 11.5–14.5)
HCT VFR BLD AUTO: 38 % (ref 36–46)
HGB BLD-MCNC: 12.8 G/DL (ref 12–16)
MCH RBC QN AUTO: 28.6 PG (ref 26–34)
MCHC RBC AUTO-ENTMCNC: 33.7 G/DL (ref 32–36)
MCV RBC AUTO: 85 FL (ref 80–100)
NRBC BLD-RTO: 0 /100 WBCS (ref 0–0)
PLATELET # BLD AUTO: 337 X10*3/UL (ref 150–450)
RBC # BLD AUTO: 4.47 X10*6/UL (ref 4–5.2)
WBC # BLD AUTO: 7.3 X10*3/UL (ref 4.4–11.3)

## 2024-08-13 ENCOUNTER — TELEPHONE (OUTPATIENT)
Dept: PREADMISSION TESTING | Facility: HOSPITAL | Age: 44
End: 2024-08-13
Payer: COMMERCIAL

## 2024-08-13 ENCOUNTER — ANESTHESIA EVENT (OUTPATIENT)
Dept: OPERATING ROOM | Facility: HOSPITAL | Age: 44
End: 2024-08-13
Payer: COMMERCIAL

## 2024-08-13 NOTE — TELEPHONE ENCOUNTER
Pre-procedure PAT phone assessment completed. Pre-operative and medication instructions reviewed with patient. Instructed to hold Vitamin D3 until after procedure. Patient verbalizes understanding of instructions.  SURGERY PRE-OPERATIVE INSTRUCTIONS    *You will receive a phone call the day before your procedure  after 2pm, (or the Friday before your surgery if scheduled on a Monday.) Generally the hospital will be calling you with this information after that time.    *You are not to eat after midnight the night before the surgery. You may have 8oz of a clear liquid up until 2 hours prior to arriving to the hospital. The exception is with medications you were instructed to take day of surgery.    *You may take tylenol for pain/discomfort as needed.     *Stop taking all aspirin products, ibuprofen (motrin/advil), naproxen (aleve/naprosyn) for one week prior to surgery.    *Stop taking all vitamins and supplements one week prior to surgery.     *You should not have alcoholic beverages for 24 hours before surgery.     *You should not smoke 24 hours prior to surgery.     *To help prevent surgical infections bathe/shower with Dial soap the evening before surgery.    *You can wear deodorant but no lotion, powder, or perfume/cologne. You should remove all make-up and nail polish at home.    *If you wear glasses, please bring a case for the glasses with you.    *You will be asked to remove dentures and contacts.     *Please leave all valuables at home.    *You should wear loose, comfortable clothing that will accommodate bandages and/or casts.    *You should notify your doctor of any change in your condition (fever, cold, rash, etc). Surgery may need to be re-scheduled until a time you are in better health.    *A responsible adult is required to accompany you to and from the hospital if you are receiving anesthesia or a sedative. Patients are not permitted to drive for 24 hours after anesthesia.     *You can use the   parking if you wish.     *If you have any further questions please call [phone].

## 2024-08-15 ENCOUNTER — ANESTHESIA (OUTPATIENT)
Dept: OPERATING ROOM | Facility: HOSPITAL | Age: 44
End: 2024-08-15
Payer: COMMERCIAL

## 2024-08-15 ENCOUNTER — HOSPITAL ENCOUNTER (OUTPATIENT)
Facility: HOSPITAL | Age: 44
Setting detail: OUTPATIENT SURGERY
Discharge: HOME | End: 2024-08-15
Attending: OBSTETRICS & GYNECOLOGY | Admitting: OBSTETRICS & GYNECOLOGY
Payer: COMMERCIAL

## 2024-08-15 VITALS
WEIGHT: 180.23 LBS | OXYGEN SATURATION: 99 % | HEART RATE: 67 BPM | DIASTOLIC BLOOD PRESSURE: 85 MMHG | RESPIRATION RATE: 14 BRPM | TEMPERATURE: 97 F | SYSTOLIC BLOOD PRESSURE: 164 MMHG | HEIGHT: 62 IN | BODY MASS INDEX: 33.17 KG/M2

## 2024-08-15 DIAGNOSIS — D25.9 UTERINE LEIOMYOMA, UNSPECIFIED LOCATION: ICD-10-CM

## 2024-08-15 DIAGNOSIS — N93.9 ABNORMAL UTERINE BLEEDING: ICD-10-CM

## 2024-08-15 LAB — PREGNANCY TEST URINE, POC: NEGATIVE

## 2024-08-15 PROCEDURE — 2500000001 HC RX 250 WO HCPCS SELF ADMINISTERED DRUGS (ALT 637 FOR MEDICARE OP): Performed by: OBSTETRICS & GYNECOLOGY

## 2024-08-15 PROCEDURE — 81025 URINE PREGNANCY TEST: CPT | Performed by: ANESTHESIOLOGY

## 2024-08-15 PROCEDURE — 3700000001 HC GENERAL ANESTHESIA TIME - INITIAL BASE CHARGE: Performed by: OBSTETRICS & GYNECOLOGY

## 2024-08-15 PROCEDURE — 2500000004 HC RX 250 GENERAL PHARMACY W/ HCPCS (ALT 636 FOR OP/ED): Performed by: ANESTHESIOLOGY

## 2024-08-15 PROCEDURE — 58558 HYSTEROSCOPY BIOPSY: CPT | Performed by: OBSTETRICS & GYNECOLOGY

## 2024-08-15 PROCEDURE — 7100000010 HC PHASE TWO TIME - EACH INCREMENTAL 1 MINUTE: Performed by: OBSTETRICS & GYNECOLOGY

## 2024-08-15 PROCEDURE — 88305 TISSUE EXAM BY PATHOLOGIST: CPT | Mod: TC,GEALAB | Performed by: OBSTETRICS & GYNECOLOGY

## 2024-08-15 PROCEDURE — 3600000008 HC OR TIME - EACH INCREMENTAL 1 MINUTE - PROCEDURE LEVEL THREE: Performed by: OBSTETRICS & GYNECOLOGY

## 2024-08-15 PROCEDURE — 2500000004 HC RX 250 GENERAL PHARMACY W/ HCPCS (ALT 636 FOR OP/ED): Performed by: NURSE ANESTHETIST, CERTIFIED REGISTERED

## 2024-08-15 PROCEDURE — 2500000005 HC RX 250 GENERAL PHARMACY W/O HCPCS: Performed by: ANESTHESIOLOGY

## 2024-08-15 PROCEDURE — 3600000003 HC OR TIME - INITIAL BASE CHARGE - PROCEDURE LEVEL THREE: Performed by: OBSTETRICS & GYNECOLOGY

## 2024-08-15 PROCEDURE — 7100000002 HC RECOVERY ROOM TIME - EACH INCREMENTAL 1 MINUTE: Performed by: OBSTETRICS & GYNECOLOGY

## 2024-08-15 PROCEDURE — 2500000004 HC RX 250 GENERAL PHARMACY W/ HCPCS (ALT 636 FOR OP/ED): Performed by: STUDENT IN AN ORGANIZED HEALTH CARE EDUCATION/TRAINING PROGRAM

## 2024-08-15 PROCEDURE — 7100000001 HC RECOVERY ROOM TIME - INITIAL BASE CHARGE: Performed by: OBSTETRICS & GYNECOLOGY

## 2024-08-15 PROCEDURE — 7100000009 HC PHASE TWO TIME - INITIAL BASE CHARGE: Performed by: OBSTETRICS & GYNECOLOGY

## 2024-08-15 PROCEDURE — 3700000002 HC GENERAL ANESTHESIA TIME - EACH INCREMENTAL 1 MINUTE: Performed by: OBSTETRICS & GYNECOLOGY

## 2024-08-15 PROCEDURE — 2500000005 HC RX 250 GENERAL PHARMACY W/O HCPCS: Performed by: NURSE ANESTHETIST, CERTIFIED REGISTERED

## 2024-08-15 RX ORDER — GABAPENTIN 300 MG/1
600 CAPSULE ORAL ONCE
Status: DISCONTINUED | OUTPATIENT
Start: 2024-08-15 | End: 2024-08-15 | Stop reason: HOSPADM

## 2024-08-15 RX ORDER — PROPOFOL 10 MG/ML
INJECTION, EMULSION INTRAVENOUS AS NEEDED
Status: DISCONTINUED | OUTPATIENT
Start: 2024-08-15 | End: 2024-08-15

## 2024-08-15 RX ORDER — ONDANSETRON HYDROCHLORIDE 2 MG/ML
INJECTION, SOLUTION INTRAVENOUS AS NEEDED
Status: DISCONTINUED | OUTPATIENT
Start: 2024-08-15 | End: 2024-08-15

## 2024-08-15 RX ORDER — ONDANSETRON HYDROCHLORIDE 2 MG/ML
4 INJECTION, SOLUTION INTRAVENOUS ONCE AS NEEDED
Status: DISCONTINUED | OUTPATIENT
Start: 2024-08-15 | End: 2024-08-15 | Stop reason: HOSPADM

## 2024-08-15 RX ORDER — MIDAZOLAM HYDROCHLORIDE 1 MG/ML
INJECTION INTRAMUSCULAR; INTRAVENOUS AS NEEDED
Status: DISCONTINUED | OUTPATIENT
Start: 2024-08-15 | End: 2024-08-15

## 2024-08-15 RX ORDER — FENTANYL CITRATE 50 UG/ML
INJECTION, SOLUTION INTRAMUSCULAR; INTRAVENOUS AS NEEDED
Status: DISCONTINUED | OUTPATIENT
Start: 2024-08-15 | End: 2024-08-15

## 2024-08-15 RX ORDER — DROPERIDOL 2.5 MG/ML
0.62 INJECTION, SOLUTION INTRAMUSCULAR; INTRAVENOUS ONCE AS NEEDED
Status: DISCONTINUED | OUTPATIENT
Start: 2024-08-15 | End: 2024-08-15 | Stop reason: HOSPADM

## 2024-08-15 RX ORDER — ACETAMINOPHEN 325 MG/1
975 TABLET ORAL ONCE
Status: COMPLETED | OUTPATIENT
Start: 2024-08-15 | End: 2024-08-15

## 2024-08-15 RX ORDER — LABETALOL HYDROCHLORIDE 5 MG/ML
5 INJECTION, SOLUTION INTRAVENOUS ONCE AS NEEDED
Status: COMPLETED | OUTPATIENT
Start: 2024-08-15 | End: 2024-08-15

## 2024-08-15 RX ORDER — KETOROLAC TROMETHAMINE 30 MG/ML
INJECTION, SOLUTION INTRAMUSCULAR; INTRAVENOUS AS NEEDED
Status: DISCONTINUED | OUTPATIENT
Start: 2024-08-15 | End: 2024-08-15

## 2024-08-15 RX ORDER — LIDOCAINE HYDROCHLORIDE 10 MG/ML
INJECTION, SOLUTION EPIDURAL; INFILTRATION; INTRACAUDAL; PERINEURAL AS NEEDED
Status: DISCONTINUED | OUTPATIENT
Start: 2024-08-15 | End: 2024-08-15

## 2024-08-15 RX ORDER — SODIUM CHLORIDE, SODIUM LACTATE, POTASSIUM CHLORIDE, CALCIUM CHLORIDE 600; 310; 30; 20 MG/100ML; MG/100ML; MG/100ML; MG/100ML
100 INJECTION, SOLUTION INTRAVENOUS CONTINUOUS
Status: DISCONTINUED | OUTPATIENT
Start: 2024-08-15 | End: 2024-08-15 | Stop reason: HOSPADM

## 2024-08-15 RX ORDER — SUCCINYLCHOLINE CHLORIDE 20 MG/ML
INJECTION INTRAMUSCULAR; INTRAVENOUS AS NEEDED
Status: DISCONTINUED | OUTPATIENT
Start: 2024-08-15 | End: 2024-08-15

## 2024-08-15 RX ORDER — OXYCODONE HYDROCHLORIDE 5 MG/1
5 TABLET ORAL EVERY 4 HOURS PRN
Status: DISCONTINUED | OUTPATIENT
Start: 2024-08-15 | End: 2024-08-15 | Stop reason: HOSPADM

## 2024-08-15 RX ORDER — SODIUM CHLORIDE, SODIUM LACTATE, POTASSIUM CHLORIDE, CALCIUM CHLORIDE 600; 310; 30; 20 MG/100ML; MG/100ML; MG/100ML; MG/100ML
20 INJECTION, SOLUTION INTRAVENOUS CONTINUOUS
Status: DISCONTINUED | OUTPATIENT
Start: 2024-08-15 | End: 2024-08-15 | Stop reason: HOSPADM

## 2024-08-15 RX ORDER — CELECOXIB 400 MG/1
400 CAPSULE ORAL ONCE
Status: COMPLETED | OUTPATIENT
Start: 2024-08-15 | End: 2024-08-15

## 2024-08-15 RX ORDER — IBUPROFEN 200 MG
200 TABLET ORAL EVERY 6 HOURS
COMMUNITY

## 2024-08-15 SDOH — HEALTH STABILITY: MENTAL HEALTH: CURRENT SMOKER: 0

## 2024-08-15 ASSESSMENT — PAIN SCALES - GENERAL
PAINLEVEL_OUTOF10: 7
PAINLEVEL_OUTOF10: 3
PAIN_LEVEL: 7
PAINLEVEL_OUTOF10: 3
PAINLEVEL_OUTOF10: 3
PAINLEVEL_OUTOF10: 0 - NO PAIN
PAINLEVEL_OUTOF10: 0 - NO PAIN
PAINLEVEL_OUTOF10: 3
PAINLEVEL_OUTOF10: 4

## 2024-08-15 ASSESSMENT — PAIN - FUNCTIONAL ASSESSMENT
PAIN_FUNCTIONAL_ASSESSMENT: 0-10

## 2024-08-15 ASSESSMENT — PAIN DESCRIPTION - LOCATION: LOCATION: INCISION

## 2024-08-15 NOTE — ANESTHESIA PREPROCEDURE EVALUATION
Patient: Camilo Red    Procedure Information       Date/Time: 08/15/24 1130    Procedures:       HYSTEROSCOPY, DIAGNOSTIC      Dilatation and Curettage    Location: [facility] / [facility]    Surgeons: Wilfrid Jha MD            Relevant Problems   Neuro   (+) Cervical radiculopathy, acute      Musculoskeletal   (+) Scoliosis      GYN   (+) Abnormal uterine bleeding   (+) Menorrhagia   (+) Uterine leiomyoma       Clinical information reviewed:   Tobacco  Allergies  Meds   Med Hx  Surg Hx  OB Status  Fam Hx  Soc   Hx        NPO Detail:  NPO/Void Status  Date of Last Liquid: 08/15/24  Time of Last Liquid: 0600  Date of Last Solid: 08/15/24  Time of Last Solid: 0000  Last Intake Type: Clear fluids         Physical Exam    Airway  Mallampati: I  TM distance: >3 FB     Cardiovascular - normal exam     Dental        Pulmonary - normal exam     Abdominal - normal exam             Anesthesia Plan    History of general anesthesia?: yes  History of complications of general anesthesia?: no    ASA 2     general     The patient is not a current smoker.    intravenous induction   Postoperative administration of opioids is intended.  Trial extubation is planned.  Anesthetic plan and risks discussed with patient.  Use of blood products discussed with patient who consented to blood products.

## 2024-08-15 NOTE — DISCHARGE INSTRUCTIONS
"Dilation and curettage (D&C)    The Basics  Written by the doctors and editors at [facility]  What is dilation and curettage? -- This is a procedure to remove tissue from the inside of the uterus (figure 1). It is also called a \"D and C\" or \"D&C.\"  During a D&C, a doctor first opens, or \"dilates,\" the cervix. (The cervix is the bottom part, or the \"neck,\" of the uterus.) Then, they put a surgical tool called a \"curette\" through the vagina and cervix, and up into the uterus. They use the curette to scrape and remove tissue from the uterus.  A D&C is done in an operating room in a hospital or clinic.  Why might my doctor do a D&C? -- Your doctor might do a D&C to figure out the cause of a symptom or problem. During a D&C, a doctor gets a sample of tissue from your uterus. Then, the sample can be checked for abnormal cells, cancer, or other problems.  You might have a D&C to:  ? Stop severe vaginal bleeding - For example, you might have a D&C if your period is too heavy.  ? Figure out the cause of abnormal bleeding - For example, you might have a D&C if you have very heavy periods, or if you have vaginal bleeding after going through menopause. This might help your doctor figure out what is causing the problem.  ? Get more information after an abnormal result from another test - For example, if you had a test to check for uterine cancer, your doctor might do a D&C to learn more.  Doctors can also do a D&C for other reasons. In pregnant or recently pregnant people, a doctor can do a D&C to:  ? Remove any pregnancy tissue that is left in the uterus after pregnancy loss - Pregnancy loss, or \"miscarriage,\" is when a pregnancy ends on its own.  ? Remove any pregnancy tissue that is left in the uterus after childbirth  ? Remove an abnormal growth called a \"molar pregnancy\" that has formed in the uterus  ? Do an  (end a pregnancy) during the first trimester  What should I do before a D&C? -- Your doctor will give you " "instructions about what to do before a D&C. They will probably tell you not to eat or drink anything starting the night before the procedure.  Your doctor might give you medicine to put inside your vagina near your cervix the day before your D&C. The medicine can soften your cervix or start to dilate it.  Tell the doctor or nurse if you have any trouble getting ready for your D&C, or if you have questions about what to expect.  What happens during a D&C? -- You will have a thin tube that goes into a vein, called an \"IV,\" put in your arm or hand. You will get fluids and medicines through the IV. Some of these medicines will make you feel relaxed, sleepy, or numb during the procedure.  When a doctor does a D&C to find out why a person is having symptoms, they might do another test called a \"hysteroscopy\" at the same time. During a hysteroscopy, the doctor puts a small camera inside the uterus to look for problems. If they find a growth in the uterus, they might do a procedure to remove it.  When a doctor does a D&C to treat a problem or condition, they will remove anything concerning that is inside the uterus. They might also scrape away some of the lining of the uterus.  What happens after a D&C? -- After a D&C:  ? Your doctor or nurse will watch you for a while to make sure that you don't have any problems. This might take up to a few hours. When you are able to go home from the hospital, someone else should drive you.  ? You might have mild cramping and slight bleeding, or \"spotting,\" from the vagina. These can last for a few days. If you have pain, you can take a pain-relieving medicine.  ? Your doctor or nurse will tell you when you can start your usual activities again. They will also tell you when it is safe to have sex and put things, such as tampons, in your vagina.  ? Typically, you will get your period within 4 to 6 weeks after a D&C.  What are the risks of a D&C? -- Your doctor will talk to you about all " of the possible risks, and answer your questions. Possible risks include:  ? Tear in the uterus  ? Injury to the cervix  ? Infection  ? Areas of scar tissue that form in the uterus  These risks are rare. But if any of these things happen, your doctor will let you know if any other treatments are needed.  When should I call the doctor? -- Call your doctor or nurse if you have any of the following problems after your D&C:  ? Fever higher than 100.4°F (38°C)  ? Cramps that last more than 2 days  ? Pain that gets worse  ? Heavy vaginal bleeding, or vaginal bleeding that lasts more than 2 weeks  ? Vaginal discharge that is green or smells bad  All topics are updated as new evidence becomes available and our peer review process is complete.  This topic retrieved from [employer] on: May 30, 2024.  Topic 83327 Version 17.0  Release: 32.5.3 - C32.150  © 2024 UpToDate, Inc. and/or its affiliates. All rights reserved.  figure 1: Female reproductive anatomy

## 2024-08-15 NOTE — OP NOTE
Date: 8/15/2024  OR Location: [facility]    Name: Camilo Red, : 1980, Age: 44 y.o., MRN: [id number], Sex: female    Diagnosis  Pre-op Diagnosis      * Abnormal uterine bleeding [N93.9]     * Uterine leiomyoma, unspecified location [D25.9] Post-op Diagnosis     * Abnormal uterine bleeding [N93.9]     * Uterine leiomyoma, unspecified location [D25.9]     Procedures  HYSTEROSCOPY, DIAGNOSTIC  64277 - [place] HYSTEROSCOPY DIAGNOSTIC SEPARATE PROCEDURE    Dilatation and Curettage  57322 - [place] HYSTEROSCOPY BX ENDOMETRIUM&/POLYPC W/WO D&C  Under general anesthetic lithotomy position patient prepped draped usual manner.  Weighted speculum placed.  Trosper tenaculum used to grasp the anterior lip of the cervix.  Internal os dilated.  Hiscock inserted.  Small amount of polypoid tissue on the anterior wall of the uterus no evidence of submucous fibroids.  Sharp curettage done for endometrial curettings.  Blood loss 25 cc counts correct transferred to recovery in stable condition    Surgeons      * Wilfrid Jha - Primary    Resident/Fellow/Other Assistant:  Surgeons and Role:  * No surgeons found with a matching role *    Procedure Summary  Anesthesia: General  ASA: II  Anesthesia Staff: Anesthesiologist: Frank Bai MD  CRNA: YUE Jo-CRNA  Estimated Blood Loss: 25mL  Intra-op Medications: Administrations occurring from 1130 to 1230 on 08/15/24:  * No intraprocedure medications in log *        Specimen:   ID Type Source Tests Collected by Time   1 : ENDOMETRIAL CURETINGS Tissue ENDOMETRIUM CURETTINGS SURGICAL PATHOLOGY EXAM Wilfrid Jha MD 8/15/2024 1103        Staff:   Joyceulator: Phyllis  Circulator: Sisi Maddox Person: Conrad         Procedure Details:  The patient was seen in the preoperative area. The site of surgery was properly noted/marked if necessary per policy. The patient has been actively warmed in preoperative area. Preoperative antibiotics are not indicated. Venous thrombosis prophylaxis have been  ordered including bilateral sequential compression devices    Findings: Relatively normal uterine cavity.  Small amount of polypoid tissue anterior wall of uterus    Complications:  None; patient tolerated the procedure well.     Disposition: PACU - hemodynamically stable.  Condition: stable

## 2024-08-15 NOTE — ANESTHESIA POSTPROCEDURE EVALUATION
Patient: Camilo Red    Procedure Summary       Date: 08/15/24 Room / Location: [facility] / [facility]    Anesthesia Start: 1045 Anesthesia Stop: 1124    Procedures:       HYSTEROSCOPY, DIAGNOSTIC      Dilatation and Curettage Diagnosis:       Abnormal uterine bleeding      Uterine leiomyoma, unspecified location      (Abnormal uterine bleeding [N93.9])      (Uterine leiomyoma, unspecified location [D25.9])    Surgeons: Wilfrid Jha MD Responsible Provider: Frank Bai MD    Anesthesia Type: general ASA Status: 2            Anesthesia Type: general    Vitals Value Taken Time   /79 08/15/24 1131   Temp 36.1 °C (97 °F) 08/15/24 1118   Pulse 69 08/15/24 1142   Resp 13 08/15/24 1118   SpO2 100 % 08/15/24 1142   Vitals shown include unfiled device data.    Anesthesia Post Evaluation    Patient location during evaluation: PACU  Patient participation: complete - patient participated  Level of consciousness: awake and alert  Pain score: 7  Pain management: adequate  Multimodal analgesia pain management approach  Airway patency: patent  Cardiovascular status: hypertensive  Respiratory status: acceptable  Hydration status: acceptable  Postoperative Nausea and Vomiting: none        No notable events documented.

## 2024-08-15 NOTE — H&P
History Of Present Illness  Camilo Red is a 44 y.o. female presenting with    Wilfrid Jha MD  Physician  Obstetrics     Progress Notes     Signed     Encounter Date: 2024     Signed       Expand All Collapse All       Subjective  Patient ID: Camilo Red is a 44 y.o. female who presents for Follow-up.  Review of Dorcas last office note;     Yvonne SHAINA Anh, APRN-CNM, ND   Midwife  Obstetrics and Gynecology     Progress Notes     Signed     Encounter Date: 2024     Signed      Expand All Collapse All        Subjective  Patient ID: Gricelda Red is a 44 y.o. female  who presents for irregular periods.  Pt says that for the past 3 mos menses are longer and heavier and more painful. Menses last 6 days with last day heaviest bleeding-- goes through a pad and hour on last day.  Pt also c/o x.1 yr lower abd pain that starts 7 days before onset menses and lasts until menses end.  Pt says this pain is an 8 on  1-10 pain scale. Pt also c/o wt gain recently. (US ordered 2023 was not obtained by pt she says)     HPI  PMHx: last pap  NIL Neg; mammogram  Neg  SocH:  24 yrs  ROS: NAD, no urinary c/o, no vaginal c/o  Review of Systems   Genitourinary:  Positive for menstrual problem, pelvic pain and vaginal bleeding.               Objective  Physical Exam  Constitutional:       Appearance: Normal appearance. She is obese.   HENT:      Head: Normocephalic.   Pulmonary:      Effort: Pulmonary effort is normal.   Neurological:      Mental Status: She is alert.   Psychiatric:         Behavior: Behavior normal.         Thought Content: Thought content normal.                  Assessment/Plan  Diagnoses and all orders for this visit:  Pelvic pain in female  -     Follicle Stimulating Hormone; Future  -     Thyroid Stimulating Hormone; Future  -     Luteinizing Hormone; Future  -     US PELVIS TRANSABDOMINAL WITH TRANSVAGINAL; Future  Dysmenorrhea  -     Follicle Stimulating Hormone; Future  -     Thyroid  Stimulating Hormone; Future  -     Luteinizing Hormone; Future  -     US PELVIS TRANSABDOMINAL WITH TRANSVAGINAL; Future  Menorrhagia with regular cycle  -     Follicle Stimulating Hormone; Future  -     Thyroid Stimulating Hormone; Future  -     Luteinizing Hormone; Future  -     US PELVIS TRANSABDOMINAL WITH TRANSVAGINAL; Future     We discussed palliative measures for pelvic pain-- pt cannot take advil anymore d/t stomach upset.   She is advised to RTO for FU with Dr Jha after US and  lab work is obtained  Pt is advised to also FU with PCP for support with wt loss        Jone Kamara, YUE-AMANDA, ND 07/11/24      Review of pelvic ultrasound;  US PELVIS TRANSABDOMINAL WITH TRANSVAGINAL  Status: Final result     Study Result     Narrative & Impression  Interpreted By:  Melanie Corado,   STUDY:  US PELVIS TRANSABDOMINAL WITH TRANSVAGINAL; 7/12/2024 5:11 pm      INDICATION:  Signs/Symptoms:pelvic pain.      COMPARISON:  12/18/2020      ACCESSION NUMBER(S):  [id number]      ORDERING CLINICIAN:  JONE KAMARA      TECHNIQUE:  Grayscale and color Doppler imaging of the pelvis were performed.  Transabdominal technique was utilized as well as transvaginal  ultrasound to better visualize the adnexa. Duplex Doppler  interrogation including color flow and spectral waveform analysis is  performed due to the patient's complaint of pain.      FINDINGS:  Uterus:  Size: 8.1 cm x 4.8 cm x 7.1 cm. There is a 3.2 x 3.4 x 3.6 cm solid  heterogeneous intramural mass within left side of the uterine fundus.  Endometrial Thickness: 0.9 cm. There is a roughly 1-1/2 x 2.4 cm  mixed cystic and solid lesion seen within the endometrium. Ovaries:  Right ovary: 3.2 cm x 1.9 cm x 3.2 cm.  Right ovary volume: 10.1 ml  Left ovary: 3.1 cm x 2.0 cm x 3.2 cm. There is a 1.4 x 1.4 x 1.5 cm  corpus luteum within left ovary. Left ovary volume: 10.5 ml      Duplex Doppler interrogation including color flow and spectral  waveform analysis shows normal  arterial and venous flow within each  ovary without torsion.      There is no free fluid in the pelvis.      IMPRESSION:  3.2 x 3.4 x 3.6 cm intramural leiomyoma arising from the left side of  the uterine fundus which has increased in size since prior study at  which time it measured 1.4 x 1.4 x 1.5 cm.      There is a roughly 1-1/2 by 2.4 cm mixed cystic and solid lesion  identified within the endometrium which could represent submucosal  leiomyoma, polyp, or perhaps cystic endometrial hyperplasia.  Endometrial biopsy may be beneficial for further assessment.      MACRO:  Critical Finding:  See findings. Notification was initiated on  7/13/2024 at 8:43 am by  Melanie Corado.  (**-YCF-**) Instructions:      Signed by: Melanie Corado 7/13/2024 8:43 AM  Dictation workstation:   [id number]     Established patient 44 years old.  1 child born vaginally.  20 years old.  Turning 21 this August.  She has had 1 year of worsening menses where she is noticing more bleeding cramps odor around her menses.  I reviewed her ultrasound.  It does show a fibroid but it also shows an abnormality in the endometrium.  I reviewed her hormonal assays that were normal.  Will recommend outpatient hysteroscopy D&C.  Reviewed procedure risk benefits complications recovery once we have results we have a number of options to help her bleeding as long as the pathology is benign.  Check CBC           Review of Systems   Genitourinary:  Positive for menstrual problem.               Objective  Physical Exam  Constitutional:       Appearance: Normal appearance. She is normal weight.   Neurological:      Mental Status: She is alert.                  Assessment/Plan  Para 1.  44 years old with abnormal bleeding.  Ultrasound shows fibroid and mass within the endometrium.  Obtain CBC.  Hormonal assays normal.  Organize outpatient hysteroscopy D&C.  Reviewed procedure risk benefits complications recovery.  Once we have results from D&C can offer numerous  "options to help her abnormal menses.  No medical allergies        Wilfrid Jha MD 08/06/24 2:57 P            .     Past Medical History  Past Medical History:   Diagnosis Date    Abnormal uterine bleeding     Astigmatism of both eyes     Cervical radiculopathy     Class 1 obesity with body mass index (BMI) of 33.0 to 33.9 in adult 08/06/2024    Closed fracture of base of fifth metatarsal bone of right foot 10/31/2023    Migraines     Personal history of other diseases of the musculoskeletal system and connective tissue 04/24/2021    History of neck pain    Plantar fasciitis, bilateral     Scoliosis     Uterine leiomyoma, unspecified location     Varicose veins of both lower extremities     Vitamin D deficiency     Wears glasses        Surgical History  History reviewed. No pertinent surgical history.     Social History  She reports that she has never smoked. She has never used smokeless tobacco. She reports that she does not drink alcohol and does not use drugs.    Family History  Family History   Problem Relation Name Age of Onset    Heart attack Father          Allergies  Patient has no known allergies.    Review of Systems     Physical Exam     Last Recorded Vitals  Pulse 80, temperature 36 °C (96.8 °F), resp. rate 18, height 1.575 m (5' 2\"), weight 81.8 kg (180 lb 3.6 oz), last menstrual period 07/22/2024, SpO2 100%.    Relevant Results        CBC no evidence of anemia     Assessment/Plan   Assessment & Plan  Abnormal uterine bleeding    Uterine leiomyoma      For outpatient hysteroscopy D&C       I spent 10 minutes in the professional and overall care of this patient.      Wilfrid Jha MD    "

## 2024-08-15 NOTE — ANESTHESIA PROCEDURE NOTES
Airway  Date/Time: 8/15/2024 10:59 AM  Urgency: elective    Airway not difficult    Staffing  Performed: CRNA   Authorized by: Frank Bai MD    Performed by: YUE Jo-JEREMY  Patient location during procedure: OR    Indications and Patient Condition  Indications for airway management: anesthesia  Spontaneous Ventilation: absent  Sedation level: deep  Preoxygenated: yes  Patient position: sniffing  MILS maintained throughout  Mask difficulty assessment: 1 - vent by mask  Planned trial extubation    Final Airway Details  Final airway type: supraglottic airway      Successful airway: Supraglottic airway: IGel.  Size 4     Number of attempts at approach: 1  Number of other approaches attempted: 0

## 2024-08-21 LAB
LABORATORY COMMENT REPORT: NORMAL
PATH REPORT.FINAL DX SPEC: NORMAL
PATH REPORT.GROSS SPEC: NORMAL
PATH REPORT.RELEVANT HX SPEC: NORMAL
PATH REPORT.TOTAL CANCER: NORMAL

## 2024-09-20 ENCOUNTER — HOSPITAL ENCOUNTER (OUTPATIENT)
Dept: RADIOLOGY | Facility: CLINIC | Age: 44
Discharge: HOME | End: 2024-09-20
Payer: COMMERCIAL

## 2024-09-20 ENCOUNTER — OFFICE VISIT (OUTPATIENT)
Dept: ORTHOPEDIC SURGERY | Facility: CLINIC | Age: 44
End: 2024-09-20
Payer: COMMERCIAL

## 2024-09-20 DIAGNOSIS — M77.51 OS PERONEUM SYNDROME OF RIGHT FOOT: Primary | ICD-10-CM

## 2024-09-20 DIAGNOSIS — S92.351A CLOSED FRACTURE OF BASE OF FIFTH METATARSAL BONE OF RIGHT FOOT: ICD-10-CM

## 2024-09-20 PROCEDURE — 99213 OFFICE O/P EST LOW 20 MIN: CPT | Performed by: STUDENT IN AN ORGANIZED HEALTH CARE EDUCATION/TRAINING PROGRAM

## 2024-09-20 PROCEDURE — 1036F TOBACCO NON-USER: CPT | Performed by: STUDENT IN AN ORGANIZED HEALTH CARE EDUCATION/TRAINING PROGRAM

## 2024-09-20 PROCEDURE — 73630 X-RAY EXAM OF FOOT: CPT | Mod: RT

## 2024-09-20 ASSESSMENT — PAIN - FUNCTIONAL ASSESSMENT: PAIN_FUNCTIONAL_ASSESSMENT: 0-10

## 2024-09-20 ASSESSMENT — PAIN SCALES - GENERAL: PAINLEVEL_OUTOF10: 5 - MODERATE PAIN

## 2024-09-21 NOTE — PROGRESS NOTES
ORTHOPAEDIC SURGERY OUTPATIENT PROGRESS NOTE    Chief Complaint:  Right foot pain    History Of Present Illness  Camilo Red is a 44 y.o. female who presents for follow-up of right foot pain from [facility], previously known to me for bilateral planter fasciitis.  Patient sustained a twisting injury to her foot from 10/31/2023.  Patient was initially seen in an urgent care setting and later in [facility].  X-rays were obtained that demonstrated base of the fifth metatarsal fracture, she was placed into a walking boot and recommended for foot and ankle orthopedic surgery follow-up.  Patient has been without DVT prophylaxis.  She is reporting severe 10 out of 10 pain in her foot.  Pain is worse with standing and is described as stabbing and sharp.  Patient reports no prior history of twisting injury to this foot or ankle.  She is starting a new job as of next week and has plans to travel for a wedding.    12/04/2023: Patient returns for follow-up of her right foot pain.  Patient has continued ambulating in a walking boot with cane assist.  She is complaining of 9 out of 10 pain.  She has noticed continued swelling and discomfort while weightbearing.  She has returned from [place] and I provided her with a prescription for Eliquis for DVT prophylaxis during travel while in the boot.  She denies any new onset numbness, tingling or weakness but she does report burning pain when ambulating, particularly in the bottom of her foot including her toes.    01/12/2024: Patient returns for follow-up of her right foot pain.  She has recently began physical therapy.  She is reporting significant improvement with respect to her symptoms, typical pain is rated as 7 out of 10.  She has been ambulating with use of a cane.  She has decreased sensitivity to her foot and is quite pleased with her progress at this point.    03/15/2024: Patient returns for follow-up of her right foot pain.  She has had significant interval improvement with respect to her  pain overall.  She is currently reporting 3 out of 10 pain that is gradually improving.  She is ambulating without the use of a assistive device.  She denies new numbness, tingling or weakness.     09/20/2024: Patient returns for follow-up of her right foot pain.  She is scheduled for a vein procedure in November of this year.  Over the course the last month and a half, she has noted a new and worsening pain on the outside of her foot.  Typical pain is reported as 5 out of 10 that is associated with distal radiation.  This is limiting her walking.  She reports that she had been doing so well that she was initially anticipating canceling this appointment.     Past Medical History  Past Medical History:   Diagnosis Date    Abnormal uterine bleeding     Astigmatism of both eyes     Cervical radiculopathy     Class 1 obesity with body mass index (BMI) of 33.0 to 33.9 in adult 08/06/2024    Closed fracture of base of fifth metatarsal bone of right foot 10/31/2023    Migraines     Personal history of other diseases of the musculoskeletal system and connective tissue 04/24/2021    History of neck pain    Plantar fasciitis, bilateral     Scoliosis     Uterine leiomyoma, unspecified location     Varicose veins of both lower extremities     Vitamin D deficiency     Wears glasses        Surgical History  Recent Surgeries in Orthopaedic Surgery            No cases to display             Social History  Social History     Socioeconomic History    Marital status:    Tobacco Use    Smoking status: Never    Smokeless tobacco: Never   Vaping Use    Vaping status: Never Used   Substance and Sexual Activity    Alcohol use: Never    Drug use: Never       Family History  Family History   Problem Relation Name Age of Onset    Heart attack Father          Allergies  No Known Allergies    Review of Systems  REVIEW OF SYSTEMS  Constitutional: no unplanned weight loss  Psychiatric: no suicidal ideation  ENT: no vision changes, no  sinus problems  Pulmonary: no shortness of breath during office visit today  Lymphatic: no enlarged lymph nodes  Cardiovascular: no chest pain or shortness of breath during office visit today  Gastrointestinal: no stomach problems  Genitourinary: no dysuria   Skin: no rashes  Endocrine: no thyroid problems  Neurological: no headache, no numbness  Hematological: no easy bruising  Musculoskeletal: Right foot pain     Physical Exam  PHYSICAL EXAMINATION  Constitutional Exam: well developed and well nourished  Psychiatric Exam: alert and oriented, appropriate mood and behavior  Eye Exam: EOMI  Pulmonary Exam: breathing non-labored, no apparent distress  Lymphatic exam: no appreciable lymphadenopathy in the lower extremities  Cardiovascular exam: RRR to peripheral palpation, DP pulses 2+, PT 2+, toes are pink with good capillary refill, no pitting edema  Skin exam: no open lesions, rashes, abrasions or ulcerations  Neurological exam: sensation to light touch intact in both lower extremities in peripheral and dermatomal distributions (except for any abnormalities noted in musculoskeletal exam)    Musculoskeletal exam: Right lower extremity examination.  Patient pain localized to the lateral aspect of the midfoot.  She is tender to palpation just proximal to the base of the fifth metatarsal, relatively nontender to palpation along the length of the peroneal tendons beginning approximately in the retrofibular groove.  Patient has supple and pain-free ankle, subtalar midtarsal joint range of motion.  She has sensation intact light touch grossly in a saphenous, sural, superficial peroneal, deep peroneal and tibial nerve distribution.  She has intact PF/DF/EHL and no significant pain with inversion but eversion recreate her typical ambulatory pain.  She has 2+ DP/PT pulses palpated.     Last Recorded Vitals  There were no vitals taken for this visit.    Laboratory Results  No results found for this or any previous visit (from  the past 24 hour(s)).     Radiology Results  X-ray imaging 3 view weightbearing right foot reviewed from 03/15/2024 and independently evaluated by me demonstrates interval healing about base of fifth metatarsal avulsion fracture.    X-ray imaging 3 view weightbearing right foot reviewed from 09/20/2024 and independently evaluated by me demonstrates interval healing of previous metatarsal base avulsion fracture which is visualized on at least 2 views.  There is note of os peroneum immediately adjacent to the cuboid.    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  I have reviewed the diagnosis and treatment options extensively with the patient.  In my impression the patient may continue weightbearing to her tolerance in her right lower extremity.  I will provide her with a formal referral to physical therapy for peroneal stretching and strengthening.  I we will plan to see the patient back in approximately 6 weeks for repeat clinical evaluation.  If the patient is not clinically improving, would obtain MRI right ankle without contrast with oblique reconstructions to more completely evaluate her peroneal tendons and the os peroneum visualized on x-ray radiographs.  Upon return, patient would not require further imaging.    Pastor Briggs MD, MARTÍN  Department of Orthopaedic Surgery  [facility]    The diagnosis and treatment plan were reviewed with the patient. All questions were answered. The patient verbalized understanding of the treatment plan. There were no barriers to understanding identified.    Note dictated with Neteven software.  Completed without full type editing and sent to avoid delay.

## 2024-09-27 ENCOUNTER — APPOINTMENT (OUTPATIENT)
Dept: OBSTETRICS AND GYNECOLOGY | Facility: CLINIC | Age: 44
End: 2024-09-27
Payer: COMMERCIAL

## 2024-09-27 ENCOUNTER — PREP FOR PROCEDURE (OUTPATIENT)
Dept: OBSTETRICS AND GYNECOLOGY | Facility: HOSPITAL | Age: 44
End: 2024-09-27

## 2024-09-27 ENCOUNTER — HOSPITAL ENCOUNTER (OUTPATIENT)
Facility: HOSPITAL | Age: 44
Setting detail: OUTPATIENT SURGERY
End: 2024-09-27
Attending: OBSTETRICS & GYNECOLOGY | Admitting: OBSTETRICS & GYNECOLOGY
Payer: COMMERCIAL

## 2024-09-27 VITALS
SYSTOLIC BLOOD PRESSURE: 128 MMHG | DIASTOLIC BLOOD PRESSURE: 80 MMHG | HEIGHT: 62 IN | BODY MASS INDEX: 33.31 KG/M2 | WEIGHT: 181 LBS

## 2024-09-27 DIAGNOSIS — N92.4 EXCESSIVE BLEEDING IN PREMENOPAUSAL PERIOD: ICD-10-CM

## 2024-09-27 DIAGNOSIS — D25.9 UTERINE LEIOMYOMA, UNSPECIFIED LOCATION: Primary | ICD-10-CM

## 2024-09-27 PROCEDURE — 99214 OFFICE O/P EST MOD 30 MIN: CPT | Performed by: OBSTETRICS & GYNECOLOGY

## 2024-09-27 PROCEDURE — 3008F BODY MASS INDEX DOCD: CPT | Performed by: OBSTETRICS & GYNECOLOGY

## 2024-09-27 RX ORDER — CELECOXIB 200 MG/1
400 CAPSULE ORAL ONCE
OUTPATIENT
Start: 2024-09-27 | End: 2024-09-27

## 2024-09-27 RX ORDER — GABAPENTIN 600 MG/1
600 TABLET ORAL ONCE
OUTPATIENT
Start: 2024-09-27 | End: 2024-09-27

## 2024-09-27 RX ORDER — SODIUM CHLORIDE, SODIUM LACTATE, POTASSIUM CHLORIDE, CALCIUM CHLORIDE 600; 310; 30; 20 MG/100ML; MG/100ML; MG/100ML; MG/100ML
20 INJECTION, SOLUTION INTRAVENOUS CONTINUOUS
OUTPATIENT
Start: 2024-09-27

## 2024-09-27 RX ORDER — ACETAMINOPHEN 325 MG/1
975 TABLET ORAL ONCE
OUTPATIENT
Start: 2024-09-27 | End: 2024-09-27

## 2024-09-27 NOTE — PROGRESS NOTES
"Subjective   Patient ID: Camilo Red is a 44 y.o. female who presents for Post-op.  Following up after outpatient hysteroscopy D&C,    Established patient 44 years old.  1 child born vaginally.  20 years old.  Turning 21 this August.  She has had 1 year of worsening menses where she is noticing more bleeding cramps odor around her menses.  I reviewed her ultrasound.  It does show a fibroid but it also shows an abnormality in the endometrium.  I reviewed her hormonal assays that were normal.  Will recommend outpatient hysteroscopy D&C.  Reviewed procedure risk benefits complications recovery once we have results we have a number of options to help her bleeding as long as the pathology is benign.  Check CBC    Reviewed pathology benign.                 Case Report  Surgical Pathology                                Case: [id number]                                  Authorizing Provider:  Wilfrid Jha MD            Collected:           08/15/2024 1103              Ordering Location:     [facility]   Received:            08/15/2024 1350                                     OR                                                                          Pathologist:           Alisa Najera MD                                                          Specimen:    ENDOMETRIUM CURETTINGS, ENDOMETRIAL CURETTINGS                                               FINAL DIAGNOSIS  A. ENDOMETRIUM, CURETTINGS:   -- Secretory pattern endometrium  Electronically signed by Alisa Najera MD on 8/21/2024 at 1221    By the signature on this report, the individual or group listed as making the Final Interpretation/Diagnosis certifies that they have reviewed this case.     Clinical History  Pre-op diagnosis:  Abnormal uterine bleeding [N93.9]  Uterine leiomyoma, unspecified location [D25.9]    Gross Description  A: Received in formalin\, labeled with the patient's name and hospital number and \"[facility]\", are multiple fragments of mucus, " blood, and soft tissue aggregating to 3.3 x 2.0 x 0.6 cm.  The specimen is submitted in toto in two base cassette.   BMG    CBC normal    Review options for fibroid uterus and heavy menstrual bleeding.  She is not good with medications that we talked about my family.  I think the 2 best options would either be an in office Mirena or endometrial ablation.  We also talked about hysterectomy but this is usually our last option if no other procedures have worked.  I will give her information pamphlets on both outpatient endometrial ablation and Mirena inserted in the office..  We discussed that over time her periods are slowly getting got worse because of her fibroid    She is leaning towards endometrial ablation.  Reviewed the procedure risk benefits complications and recovery.  Discussed that is not a guarantee of amenorrhea but in 90% of women it does significantly reduce the bleeding.  We also talked about the Mirena which would be an in office procedure.  Discussed that both the Mirena and ablation do not really affect the fibroid our goal is to reduce her bleeding    After reviewing all options she would like to proceed with endometrial ablation.  Reviewed procedure risk benefits complications recovery.  Information pamphlet given        Review of Systems    Objective   Physical Exam    Assessment/Plan   Outpatient endometrial ablation for heavy menstrual bleeding and fibroid uterus..  No medical allergies         Wilfrid Jha MD 09/27/24 10:29 AM    complains of pain/discomfort

## 2024-10-11 ASSESSMENT — CUP TO DISC RATIO
OS_RATIO: 0.3
OD_RATIO: 0.3

## 2024-10-11 ASSESSMENT — SLIT LAMP EXAM - LIDS
COMMENTS: GOOD POSITION
COMMENTS: GOOD POSITION

## 2024-10-11 ASSESSMENT — EXTERNAL EXAM - RIGHT EYE: OD_EXAM: NORMAL

## 2024-10-11 ASSESSMENT — EXTERNAL EXAM - LEFT EYE: OS_EXAM: NORMAL

## 2024-10-11 NOTE — PROGRESS NOTES
Nevus of conjunctiva, right eye  -Noted on exam in 2022 ([facility]ass)  -Patient hasn't noted change over time.   -No concerning features on exam at this time - appearance stable. Recommend observation.     Eye discomfort, bilateral  -Occasional feeling of eye pressure, occurs sometimes when reading. May be due to difficulty with accommodation/presbyopia vs dry eye. May consider NVO and artificial tears PRN.    Astigmatism  -New Rx for glasses given per patient request. Patient prefers separate DVO/NVO. Patient's signature obtained to acknowledge and confirm that a paper copy of glasses Rx was given to patient in compliance with [facility] Eyeglass Rule. Electronic copy of Rx will also be available via datatracker/EPIC.   -Obtained glasses in 2022 - bifocals/progressives initially, but did not like this, had trouble with reading, switched back to DVO

## 2024-10-14 ENCOUNTER — APPOINTMENT (OUTPATIENT)
Dept: OPHTHALMOLOGY | Facility: CLINIC | Age: 44
End: 2024-10-14
Payer: COMMERCIAL

## 2024-10-14 DIAGNOSIS — H57.10 DISCOMFORT OF EYE, UNSPECIFIED LATERALITY: ICD-10-CM

## 2024-10-14 DIAGNOSIS — D31.01 NEVUS OF RIGHT CONJUNCTIVA: Primary | ICD-10-CM

## 2024-10-14 DIAGNOSIS — H52.203 ASTIGMATISM OF BOTH EYES, UNSPECIFIED TYPE: ICD-10-CM

## 2024-10-14 PROCEDURE — 92015 DETERMINE REFRACTIVE STATE: CPT | Performed by: OPHTHALMOLOGY

## 2024-10-14 PROCEDURE — 92014 COMPRE OPH EXAM EST PT 1/>: CPT | Performed by: OPHTHALMOLOGY

## 2024-10-14 ASSESSMENT — TONOMETRY
IOP_METHOD: GOLDMANN APPLANATION
OS_IOP_MMHG: 16
OD_IOP_MMHG: 16

## 2024-10-14 ASSESSMENT — ENCOUNTER SYMPTOMS: EYES NEGATIVE: 1

## 2024-10-14 ASSESSMENT — VISUAL ACUITY
METHOD: SNELLEN - LINEAR
OD_SC: 20/25
OS_SC: 20/30

## 2024-10-14 ASSESSMENT — REFRACTION_MANIFEST
OS_SPHERE: -0.50
OD_SPHERE: -0.50
OS_CYLINDER: -0.50
OD_AXIS: 090
OS_ADD: +1.50
OD_ADD: +1.50
OS_AXIS: 090
OD_CYLINDER: -0.75

## 2024-10-14 ASSESSMENT — REFRACTION_WEARINGRX
OS_SPHERE: DIDNT BRING
OD_SPHERE: DIDNT BRING

## 2024-10-14 ASSESSMENT — CONF VISUAL FIELD
OS_NORMAL: 1
OS_INFERIOR_NASAL_RESTRICTION: 0
OD_SUPERIOR_TEMPORAL_RESTRICTION: 0
OS_SUPERIOR_TEMPORAL_RESTRICTION: 0
OD_INFERIOR_NASAL_RESTRICTION: 0
OD_SUPERIOR_NASAL_RESTRICTION: 0
OS_SUPERIOR_NASAL_RESTRICTION: 0
OS_INFERIOR_TEMPORAL_RESTRICTION: 0
OD_INFERIOR_TEMPORAL_RESTRICTION: 0
OD_NORMAL: 1

## 2024-11-01 ENCOUNTER — APPOINTMENT (OUTPATIENT)
Dept: ORTHOPEDIC SURGERY | Facility: CLINIC | Age: 44
End: 2024-11-01
Payer: COMMERCIAL

## 2025-04-25 ENCOUNTER — APPOINTMENT (OUTPATIENT)
Dept: PRIMARY CARE | Facility: CLINIC | Age: 45
End: 2025-04-25
Payer: COMMERCIAL

## 2025-05-02 ENCOUNTER — OFFICE VISIT (OUTPATIENT)
Dept: PRIMARY CARE | Facility: CLINIC | Age: 45
End: 2025-05-02
Payer: COMMERCIAL

## 2025-05-02 ENCOUNTER — APPOINTMENT (OUTPATIENT)
Dept: PRIMARY CARE | Facility: CLINIC | Age: 45
End: 2025-05-02
Payer: COMMERCIAL

## 2025-05-02 VITALS
TEMPERATURE: 97.8 F | HEART RATE: 90 BPM | SYSTOLIC BLOOD PRESSURE: 130 MMHG | BODY MASS INDEX: 33.49 KG/M2 | WEIGHT: 182 LBS | OXYGEN SATURATION: 97 % | HEIGHT: 62 IN | DIASTOLIC BLOOD PRESSURE: 86 MMHG

## 2025-05-02 DIAGNOSIS — E53.8 B12 DEFICIENCY: ICD-10-CM

## 2025-05-02 DIAGNOSIS — L70.9 ACNE, UNSPECIFIED ACNE TYPE: ICD-10-CM

## 2025-05-02 DIAGNOSIS — Z13.228 SCREENING FOR METABOLIC DISORDER: ICD-10-CM

## 2025-05-02 DIAGNOSIS — E55.9 VITAMIN D DEFICIENCY: ICD-10-CM

## 2025-05-02 DIAGNOSIS — Z13.6 SCREENING FOR CARDIOVASCULAR CONDITION: ICD-10-CM

## 2025-05-02 DIAGNOSIS — Z12.31 SCREENING MAMMOGRAM FOR BREAST CANCER: ICD-10-CM

## 2025-05-02 DIAGNOSIS — Z12.11 SCREENING FOR COLON CANCER: ICD-10-CM

## 2025-05-02 DIAGNOSIS — Z00.00 ANNUAL PHYSICAL EXAM: Primary | ICD-10-CM

## 2025-05-02 PROCEDURE — 1036F TOBACCO NON-USER: CPT | Performed by: INTERNAL MEDICINE

## 2025-05-02 PROCEDURE — 99396 PREV VISIT EST AGE 40-64: CPT | Performed by: INTERNAL MEDICINE

## 2025-05-02 PROCEDURE — 99213 OFFICE O/P EST LOW 20 MIN: CPT | Performed by: INTERNAL MEDICINE

## 2025-05-02 PROCEDURE — 3008F BODY MASS INDEX DOCD: CPT | Performed by: INTERNAL MEDICINE

## 2025-05-02 RX ORDER — CLINDAMYCIN PHOSPHATE 10 MG/G
GEL TOPICAL 2 TIMES DAILY
Qty: 60 G | Refills: 5 | Status: SHIPPED | OUTPATIENT
Start: 2025-05-02 | End: 2026-05-02

## 2025-05-02 ASSESSMENT — ENCOUNTER SYMPTOMS
TROUBLE SWALLOWING: 0
TREMORS: 0
DEPRESSION: 0
ROS SKIN COMMENTS: ACNE+
SHORTNESS OF BREATH: 0
ABDOMINAL PAIN: 0
BLOOD IN STOOL: 0
PALPITATIONS: 0
DYSURIA: 0
POLYPHAGIA: 0
BACK PAIN: 0
COUGH: 0
WHEEZING: 0
LOSS OF SENSATION IN FEET: 0
POLYDIPSIA: 0
FATIGUE: 0
NAUSEA: 0
VOMITING: 0
MYALGIAS: 0
HEADACHES: 1
SEIZURES: 0
OCCASIONAL FEELINGS OF UNSTEADINESS: 0
NUMBNESS: 0
CHILLS: 0
FREQUENCY: 0
SINUS PRESSURE: 0
UNEXPECTED WEIGHT CHANGE: 0

## 2025-05-02 ASSESSMENT — PAIN SCALES - GENERAL: PAINLEVEL_OUTOF10: 0-NO PAIN

## 2025-05-02 ASSESSMENT — COLUMBIA-SUICIDE SEVERITY RATING SCALE - C-SSRS
2. HAVE YOU ACTUALLY HAD ANY THOUGHTS OF KILLING YOURSELF?: NO
1. IN THE PAST MONTH, HAVE YOU WISHED YOU WERE DEAD OR WISHED YOU COULD GO TO SLEEP AND NOT WAKE UP?: NO
6. HAVE YOU EVER DONE ANYTHING, STARTED TO DO ANYTHING, OR PREPARED TO DO ANYTHING TO END YOUR LIFE?: NO

## 2025-05-02 NOTE — PROGRESS NOTES
"Subjective   Patient ID: Camilo Red is a 44 y.o. female who presents for Annual Exam.    HPI       Has history of migraine headaches, takes Excedrin as needed.  Stated has migraine headaches especially during her menstrual cycle.  Feels dizzy whenever she has headache.  Had fractured base of 5th metatarsal of right foot, she missed a step in October of 2023,     Pain in lower abdomen each time after eating, has diarrhea every time after she eats    Review of Systems   Constitutional:  Negative for chills, fatigue and unexpected weight change.   HENT:  Negative for postnasal drip, sinus pressure and trouble swallowing.    Respiratory:  Negative for cough, shortness of breath and wheezing.    Cardiovascular:  Negative for chest pain, palpitations and leg swelling.   Gastrointestinal:  Negative for abdominal pain, blood in stool, nausea and vomiting.   Endocrine: Negative for polydipsia, polyphagia and polyuria.   Genitourinary:  Negative for dysuria and frequency.   Musculoskeletal:  Negative for back pain and myalgias.   Skin:         Acne+   Neurological:  Positive for headaches. Negative for tremors, seizures and numbness.   Psychiatric/Behavioral:  Negative for behavioral problems.        Objective   /86 (BP Location: Left arm, Patient Position: Sitting, BP Cuff Size: Large adult)   Pulse 90   Temp 36.6 °C (97.8 °F) (Temporal)   Ht 1.575 m (5' 2\")   Wt 82.6 kg (182 lb)   SpO2 97%   BMI 33.29 kg/m²     Physical Exam  Constitutional:       General: She is not in acute distress.  HENT:      Head: Normocephalic and atraumatic.      Right Ear: Tympanic membrane normal.      Left Ear: Tympanic membrane normal.   Eyes:      Extraocular Movements: Extraocular movements intact.      Conjunctiva/sclera: Conjunctivae normal.      Pupils: Pupils are equal, round, and reactive to light.   Cardiovascular:      Rate and Rhythm: Normal rate and regular rhythm.      Pulses: Normal pulses.      Heart sounds: No murmur " heard.  Pulmonary:      Effort: Pulmonary effort is normal.      Breath sounds: Normal breath sounds. No wheezing or rales.   Chest:   Breasts:     Right: Normal.      Left: Normal.   Abdominal:      General: Bowel sounds are normal.      Palpations: Abdomen is soft. There is no mass.      Tenderness: There is no abdominal tenderness. There is no guarding.   Musculoskeletal:      Right lower leg: No edema.      Left lower leg: No edema.      Comments: Right foot in boot   Lymphadenopathy:      Cervical: No cervical adenopathy.   Skin:     General: Skin is warm and dry.      Comments: Acne on face   Neurological:      Mental Status: She is alert and oriented to person, place, and time.      Cranial Nerves: No cranial nerve deficit.   Psychiatric:         Mood and Affect: Mood normal.         Assessment/Plan       Camilo was seen today for annual exam.  Diagnoses and all orders for this visit:  Annual physical exam (Primary)  -     CBC and Auto Differential; Future  -     Comprehensive Metabolic Panel; Future  -     CBC and Auto Differential  -     Comprehensive Metabolic Panel  Screening for metabolic disorder  -     TSH with reflex to Free T4 if abnormal; Future  -     TSH with reflex to Free T4 if abnormal  Screening for cardiovascular condition  -     Lipid Panel; Future  -     Lipid Panel  Vitamin D deficiency  -     Vitamin D 25-Hydroxy,Total (for eval of Vitamin D levels); Future  -     Vitamin D 25-Hydroxy,Total (for eval of Vitamin D levels)  Screening mammogram for breast cancer  -     BI mammo bilateral screening tomosynthesis; Future  B12 deficiency  -     Vitamin B12; Future  -     Vitamin B12  Acne, unspecified acne type  -     clindamycin (Cleocin T) 1 % gel; Apply topically 2 times a day. apply to affected area  Screening for colon cancer  -     Referral to Gastroenterology; Future       Advised to avoid dairy products for few weeks to see if her symptoms improve, symptoms likely due to lactose  intolerance  Follow-up with dermatology if no improvement in acne    Current Outpatient Medications   Medication Instructions    cholecalciferol (VITAMIN D3) 2,000 Units, Daily    clindamycin (Cleocin T) 1 % gel Topical, 2 times daily, apply to affected area    ibuprofen 200 mg, Every 6 hours    SUMAtriptan (IMITREX) 50 mg, oral, Once as needed, May repeat after 2 hours.

## 2025-05-23 DIAGNOSIS — R73.9 HYPERGLYCEMIA: Primary | ICD-10-CM

## 2025-05-23 DIAGNOSIS — Z11.1 SCREENING FOR TUBERCULOSIS: ICD-10-CM

## 2025-05-23 LAB
25(OH)D3+25(OH)D2 SERPL-MCNC: 17 NG/ML (ref 30–100)
ALBUMIN SERPL-MCNC: 4.5 G/DL (ref 3.6–5.1)
ALP SERPL-CCNC: 66 U/L (ref 31–125)
ALT SERPL-CCNC: 26 U/L (ref 6–29)
ANION GAP SERPL CALCULATED.4IONS-SCNC: 6 MMOL/L (CALC) (ref 7–17)
AST SERPL-CCNC: 21 U/L (ref 10–30)
BASOPHILS # BLD AUTO: 22 CELLS/UL (ref 0–200)
BASOPHILS NFR BLD AUTO: 0.4 %
BILIRUB SERPL-MCNC: 0.5 MG/DL (ref 0.2–1.2)
BUN SERPL-MCNC: 8 MG/DL (ref 7–25)
CALCIUM SERPL-MCNC: 9.4 MG/DL (ref 8.6–10.2)
CHLORIDE SERPL-SCNC: 103 MMOL/L (ref 98–110)
CHOLEST SERPL-MCNC: 175 MG/DL
CHOLEST/HDLC SERPL: 2.1 (CALC)
CO2 SERPL-SCNC: 28 MMOL/L (ref 20–32)
CREAT SERPL-MCNC: 0.67 MG/DL (ref 0.5–0.99)
EGFRCR SERPLBLD CKD-EPI 2021: 110 ML/MIN/1.73M2
EOSINOPHIL # BLD AUTO: 67 CELLS/UL (ref 15–500)
EOSINOPHIL NFR BLD AUTO: 1.2 %
ERYTHROCYTE [DISTWIDTH] IN BLOOD BY AUTOMATED COUNT: 12.8 % (ref 11–15)
GLUCOSE SERPL-MCNC: 90 MG/DL (ref 65–99)
HCT VFR BLD AUTO: 41.9 % (ref 35–45)
HDLC SERPL-MCNC: 85 MG/DL
HGB BLD-MCNC: 13.5 G/DL (ref 11.7–15.5)
LDLC SERPL CALC-MCNC: 70 MG/DL (CALC)
LYMPHOCYTES # BLD AUTO: 1904 CELLS/UL (ref 850–3900)
LYMPHOCYTES NFR BLD AUTO: 34 %
MCH RBC QN AUTO: 28.8 PG (ref 27–33)
MCHC RBC AUTO-ENTMCNC: 32.2 G/DL (ref 32–36)
MCV RBC AUTO: 89.5 FL (ref 80–100)
MONOCYTES # BLD AUTO: 414 CELLS/UL (ref 200–950)
MONOCYTES NFR BLD AUTO: 7.4 %
NEUTROPHILS # BLD AUTO: 3192 CELLS/UL (ref 1500–7800)
NEUTROPHILS NFR BLD AUTO: 57 %
NONHDLC SERPL-MCNC: 90 MG/DL (CALC)
PLATELET # BLD AUTO: 327 THOUSAND/UL (ref 140–400)
PMV BLD REES-ECKER: 11.1 FL (ref 7.5–12.5)
POTASSIUM SERPL-SCNC: 4 MMOL/L (ref 3.5–5.3)
PROT SERPL-MCNC: 7.3 G/DL (ref 6.1–8.1)
RBC # BLD AUTO: 4.68 MILLION/UL (ref 3.8–5.1)
SODIUM SERPL-SCNC: 137 MMOL/L (ref 135–146)
TRIGL SERPL-MCNC: 123 MG/DL
TSH SERPL-ACNC: 1.32 MIU/L
VIT B12 SERPL-MCNC: 303 PG/ML (ref 200–1100)
WBC # BLD AUTO: 5.6 THOUSAND/UL (ref 3.8–10.8)

## 2025-05-28 ENCOUNTER — TELEPHONE (OUTPATIENT)
Dept: PRIMARY CARE | Facility: CLINIC | Age: 45
End: 2025-05-28
Payer: COMMERCIAL

## 2025-05-28 DIAGNOSIS — R21 RASH: Primary | ICD-10-CM

## 2025-05-28 LAB
25(OH)D3+25(OH)D2 SERPL-MCNC: 17 NG/ML (ref 30–100)
ALBUMIN SERPL-MCNC: 4.5 G/DL (ref 3.6–5.1)
ALP SERPL-CCNC: 66 U/L (ref 31–125)
ALT SERPL-CCNC: 26 U/L (ref 6–29)
ANION GAP SERPL CALCULATED.4IONS-SCNC: 6 MMOL/L (CALC) (ref 7–17)
AST SERPL-CCNC: 21 U/L (ref 10–30)
BASOPHILS # BLD AUTO: 22 CELLS/UL (ref 0–200)
BASOPHILS NFR BLD AUTO: 0.4 %
BILIRUB SERPL-MCNC: 0.5 MG/DL (ref 0.2–1.2)
BUN SERPL-MCNC: 8 MG/DL (ref 7–25)
CALCIUM SERPL-MCNC: 9.4 MG/DL (ref 8.6–10.2)
CHLORIDE SERPL-SCNC: 103 MMOL/L (ref 98–110)
CHOLEST SERPL-MCNC: 175 MG/DL
CHOLEST/HDLC SERPL: 2.1 (CALC)
CO2 SERPL-SCNC: 28 MMOL/L (ref 20–32)
CREAT SERPL-MCNC: 0.67 MG/DL (ref 0.5–0.99)
EGFRCR SERPLBLD CKD-EPI 2021: 110 ML/MIN/1.73M2
EOSINOPHIL # BLD AUTO: 67 CELLS/UL (ref 15–500)
EOSINOPHIL NFR BLD AUTO: 1.2 %
ERYTHROCYTE [DISTWIDTH] IN BLOOD BY AUTOMATED COUNT: 12.8 % (ref 11–15)
GLUCOSE SERPL-MCNC: 90 MG/DL (ref 65–99)
HBA1C MFR BLD: 5.7 %
HCT VFR BLD AUTO: 41.9 % (ref 35–45)
HDLC SERPL-MCNC: 85 MG/DL
HGB BLD-MCNC: 13.5 G/DL (ref 11.7–15.5)
LDLC SERPL CALC-MCNC: 70 MG/DL (CALC)
LYMPHOCYTES # BLD AUTO: 1904 CELLS/UL (ref 850–3900)
LYMPHOCYTES NFR BLD AUTO: 34 %
MCH RBC QN AUTO: 28.8 PG (ref 27–33)
MCHC RBC AUTO-ENTMCNC: 32.2 G/DL (ref 32–36)
MCV RBC AUTO: 89.5 FL (ref 80–100)
MONOCYTES # BLD AUTO: 414 CELLS/UL (ref 200–950)
MONOCYTES NFR BLD AUTO: 7.4 %
NEUTROPHILS # BLD AUTO: 3192 CELLS/UL (ref 1500–7800)
NEUTROPHILS NFR BLD AUTO: 57 %
NONHDLC SERPL-MCNC: 90 MG/DL (CALC)
PLATELET # BLD AUTO: 327 THOUSAND/UL (ref 140–400)
PMV BLD REES-ECKER: 11.1 FL (ref 7.5–12.5)
POTASSIUM SERPL-SCNC: 4 MMOL/L (ref 3.5–5.3)
PROT SERPL-MCNC: 7.3 G/DL (ref 6.1–8.1)
RBC # BLD AUTO: 4.68 MILLION/UL (ref 3.8–5.1)
SODIUM SERPL-SCNC: 137 MMOL/L (ref 135–146)
TRIGL SERPL-MCNC: 123 MG/DL
TSH SERPL-ACNC: 1.32 MIU/L
VIT B12 SERPL-MCNC: 303 PG/ML (ref 200–1100)
WBC # BLD AUTO: 5.6 THOUSAND/UL (ref 3.8–10.8)

## 2025-06-24 ENCOUNTER — APPOINTMENT (OUTPATIENT)
Dept: DERMATOLOGY | Facility: CLINIC | Age: 45
End: 2025-06-24
Payer: COMMERCIAL

## 2025-07-11 ENCOUNTER — APPOINTMENT (OUTPATIENT)
Dept: RADIOLOGY | Facility: CLINIC | Age: 45
End: 2025-07-11
Payer: COMMERCIAL

## 2025-08-15 ENCOUNTER — APPOINTMENT (OUTPATIENT)
Dept: ORTHOPEDIC SURGERY | Facility: CLINIC | Age: 45
End: 2025-08-15
Payer: COMMERCIAL

## 2025-08-15 DIAGNOSIS — M77.51 OS PERONEUM SYNDROME OF RIGHT FOOT: Primary | ICD-10-CM

## 2025-08-15 PROCEDURE — 99212 OFFICE O/P EST SF 10 MIN: CPT | Performed by: STUDENT IN AN ORGANIZED HEALTH CARE EDUCATION/TRAINING PROGRAM

## 2025-08-15 RX ORDER — MELOXICAM 7.5 MG/1
7.5 TABLET ORAL DAILY PRN
Qty: 30 TABLET | Refills: 11 | Status: SHIPPED | OUTPATIENT
Start: 2025-08-15 | End: 2026-08-15

## 2025-08-15 ASSESSMENT — PAIN SCALES - GENERAL: PAINLEVEL_OUTOF10: 6

## 2025-08-15 ASSESSMENT — PAIN - FUNCTIONAL ASSESSMENT: PAIN_FUNCTIONAL_ASSESSMENT: 0-10

## 2025-08-15 ASSESSMENT — PAIN DESCRIPTION - DESCRIPTORS: DESCRIPTORS: ACHING

## 2025-09-05 ENCOUNTER — HOSPITAL ENCOUNTER (OUTPATIENT)
Dept: RADIOLOGY | Facility: CLINIC | Age: 45
Discharge: HOME | End: 2025-09-05
Payer: COMMERCIAL

## 2025-09-05 ENCOUNTER — OFFICE VISIT (OUTPATIENT)
Dept: ORTHOPEDIC SURGERY | Facility: CLINIC | Age: 45
End: 2025-09-05
Payer: COMMERCIAL

## 2025-09-05 DIAGNOSIS — S92.351A CLOSED FRACTURE OF BASE OF FIFTH METATARSAL BONE OF RIGHT FOOT: ICD-10-CM

## 2025-09-05 DIAGNOSIS — M77.51 OS PERONEUM SYNDROME OF RIGHT FOOT: Primary | ICD-10-CM

## 2025-09-05 DIAGNOSIS — M77.51 OS PERONEUM SYNDROME OF RIGHT FOOT: ICD-10-CM

## 2025-09-05 PROCEDURE — 99214 OFFICE O/P EST MOD 30 MIN: CPT | Performed by: STUDENT IN AN ORGANIZED HEALTH CARE EDUCATION/TRAINING PROGRAM

## 2025-09-05 PROCEDURE — 73630 X-RAY EXAM OF FOOT: CPT | Mod: RT

## 2025-09-05 ASSESSMENT — PAIN SCALES - GENERAL: PAINLEVEL_OUTOF10: 5 - MODERATE PAIN

## 2025-09-05 ASSESSMENT — PAIN DESCRIPTION - DESCRIPTORS: DESCRIPTORS: ACHING;SORE

## 2025-09-05 ASSESSMENT — PAIN - FUNCTIONAL ASSESSMENT: PAIN_FUNCTIONAL_ASSESSMENT: 0-10

## (undated) DEVICE — Device

## (undated) DEVICE — CAP, SELF SEAL, GYNECOLOGY, F/4-10FR, ST

## (undated) DEVICE — SOLUTION, INJECTION, USP, SODIUM CHLORIDE 0.9%, .9, NACL, 1000 ML, BAG

## (undated) DEVICE — COVER HANDLE LIGHT, STERIS, BLUE, STERILE